# Patient Record
Sex: FEMALE | Race: BLACK OR AFRICAN AMERICAN | NOT HISPANIC OR LATINO | Employment: FULL TIME | URBAN - METROPOLITAN AREA
[De-identification: names, ages, dates, MRNs, and addresses within clinical notes are randomized per-mention and may not be internally consistent; named-entity substitution may affect disease eponyms.]

---

## 2017-05-26 ENCOUNTER — TRANSCRIBE ORDERS (OUTPATIENT)
Dept: OBSTETRICS AND GYNECOLOGY | Facility: CLINIC | Age: 57
End: 2017-05-26

## 2017-05-26 DIAGNOSIS — Z12.31 VISIT FOR SCREENING MAMMOGRAM: Primary | ICD-10-CM

## 2017-06-16 ENCOUNTER — APPOINTMENT (OUTPATIENT)
Dept: MAMMOGRAPHY | Facility: HOSPITAL | Age: 57
End: 2017-06-16
Attending: OBSTETRICS & GYNECOLOGY

## 2017-06-22 ENCOUNTER — TRANSCRIBE ORDERS (OUTPATIENT)
Dept: ADMINISTRATIVE | Facility: HOSPITAL | Age: 57
End: 2017-06-22

## 2017-06-22 DIAGNOSIS — Z12.31 VISIT FOR SCREENING MAMMOGRAM: Primary | ICD-10-CM

## 2017-06-23 ENCOUNTER — HOSPITAL ENCOUNTER (OUTPATIENT)
Dept: MAMMOGRAPHY | Facility: HOSPITAL | Age: 57
Discharge: HOME | End: 2017-06-23
Attending: OBSTETRICS & GYNECOLOGY | Admitting: OBSTETRICS & GYNECOLOGY

## 2017-06-23 DIAGNOSIS — Z12.31 VISIT FOR SCREENING MAMMOGRAM: ICD-10-CM

## 2017-06-23 PROCEDURE — G0202 SCR MAMMO BI INCL CAD: HCPCS

## 2017-06-23 PROCEDURE — 77063 BREAST TOMOSYNTHESIS BI: CPT

## 2017-06-23 PROCEDURE — 77067 SCR MAMMO BI INCL CAD: CPT | Performed by: RADIOLOGY

## 2017-06-23 PROCEDURE — 77063 BREAST TOMOSYNTHESIS BI: CPT | Performed by: RADIOLOGY

## 2017-06-30 ENCOUNTER — OFFICE VISIT (OUTPATIENT)
Dept: OBSTETRICS AND GYNECOLOGY | Facility: CLINIC | Age: 57
End: 2017-06-30

## 2017-06-30 VITALS
DIASTOLIC BLOOD PRESSURE: 80 MMHG | HEIGHT: 66 IN | HEART RATE: 77 BPM | WEIGHT: 208 LBS | RESPIRATION RATE: 14 BRPM | SYSTOLIC BLOOD PRESSURE: 130 MMHG | OXYGEN SATURATION: 96 % | BODY MASS INDEX: 33.43 KG/M2

## 2017-06-30 DIAGNOSIS — Z01.419 ENCOUNTER FOR GYNECOLOGICAL EXAMINATION WITHOUT ABNORMAL FINDING: Primary | ICD-10-CM

## 2017-06-30 PROCEDURE — 99396 PREV VISIT EST AGE 40-64: CPT | Performed by: OBSTETRICS & GYNECOLOGY

## 2017-07-07 NOTE — PROGRESS NOTES
"Subjective   Chief Complaint   Patient presents with   • Gynecologic Exam     No complaints     Jessika Singletary is a 56 y.o. year old  menopausal female presenting to be seen for her annual exam.  This past year she has not been on hormone replacement therapy.  There has not been vaginal bleeding in the last 12 months.  Menopausal symptoms are not present.    SEXUAL Hx:  She is not currently sexually active.  In the past year there has not been new sexual partners.    Condoms are not typically used.  She would not like to be screened for STD's at today's exam.  HEALTH Hx:  She exercises regularly: no (and has no plans to become more active).  She wears her seat belt: yes.  She has concerns about domestic violence: no.  She has noticed changes in height: no.  OTHER COMPLAINTS:  Nothing else    The following portions of the patient's history were reviewed and updated as appropriate:problem list, current medications, allergies, past family history, past medical history, past social history and past surgical history.    Smoking status: Never Smoker                                                              Smokeless status: Never Used                          Review of Systems  Constitutional POS: nothing reported    NEG: anorexia or night sweats   Gastointestinal POS: nothing reported    NEG: bloating, change in bowel habits, melena or reflux symptoms   Genitourinary POS: nothing reported    NEG: dysuria or hematuria   Integument POS: nothing reported    NEG: moles that are changing in size, shape, color or rashes   Breast POS: nothing reported    NEG: persistent breast lump, skin dimpling or nipple discharge        Objective   /80  Pulse 77  Resp 14  Ht 66\" (167.6 cm)  Wt 208 lb (94.3 kg)  LMP  (LMP Unknown)  SpO2 96%  Breastfeeding? No  BMI 33.57 kg/m2    General:  well developed; well nourished  no acute distress   Skin:  No suspicious lesions seen   Thyroid: normal to inspection and " palpation   Breasts:  Examined in supine position  Symmetric without masses or skin dimpling  Nipples normal without inversion, lesions or discharge  There are no palpable axillary nodes   Abdomen: soft, non-tender; no masses  no umbilical or inginual hernias are present  no hepato-splenomegaly   Pelvis: Clinical staff was present for exam  External genitalia:  normal appearance of the external genitalia including Bartholin's and [place]'s glands.  :  urethral meatus normal; urethral hypermobility is absent.  Vaginal:  atrophic mucosal changes are present;  Cervix:  absent.  Uterus:  absent.  Adnexa:  absent, bilateral.        Assessment   1. Well woman exam     Plan   1. Patient doing well.  Await mammogram results for plan of care.  will return to clinic in 1 year or on an as-needed basis.      No orders of the defined types were placed in this encounter.         This note was electronically signed.    MD NIDIA LagosA

## 2017-07-14 ENCOUNTER — APPOINTMENT (OUTPATIENT)
Dept: MAMMOGRAPHY | Facility: HOSPITAL | Age: 57
End: 2017-07-14
Attending: OBSTETRICS & GYNECOLOGY

## 2018-04-12 ENCOUNTER — TRANSCRIBE ORDERS (OUTPATIENT)
Dept: ADMINISTRATIVE | Facility: HOSPITAL | Age: 58
End: 2018-04-12

## 2018-04-12 DIAGNOSIS — Z12.31 VISIT FOR SCREENING MAMMOGRAM: Primary | ICD-10-CM

## 2018-06-28 ENCOUNTER — HOSPITAL ENCOUNTER (OUTPATIENT)
Dept: MAMMOGRAPHY | Facility: HOSPITAL | Age: 58
Discharge: HOME | End: 2018-06-28
Admitting: FAMILY MEDICINE

## 2018-06-28 DIAGNOSIS — Z12.31 VISIT FOR SCREENING MAMMOGRAM: ICD-10-CM

## 2018-06-28 PROCEDURE — 77067 SCR MAMMO BI INCL CAD: CPT

## 2018-06-28 PROCEDURE — 77063 BREAST TOMOSYNTHESIS BI: CPT

## 2018-06-28 PROCEDURE — 77067 SCR MAMMO BI INCL CAD: CPT | Performed by: RADIOLOGY

## 2018-06-28 PROCEDURE — 77063 BREAST TOMOSYNTHESIS BI: CPT | Performed by: RADIOLOGY

## 2019-06-10 ENCOUNTER — TRANSCRIBE ORDERS (OUTPATIENT)
Dept: ADMINISTRATIVE | Facility: HOSPITAL | Age: 59
End: 2019-06-10

## 2019-06-10 DIAGNOSIS — Z12.31 VISIT FOR SCREENING MAMMOGRAM: Primary | ICD-10-CM

## 2019-09-10 ENCOUNTER — HOSPITAL ENCOUNTER (OUTPATIENT)
Dept: MAMMOGRAPHY | Facility: HOSPITAL | Age: 59
Discharge: HOME | End: 2019-09-10
Admitting: FAMILY MEDICINE

## 2019-09-10 DIAGNOSIS — Z12.31 VISIT FOR SCREENING MAMMOGRAM: ICD-10-CM

## 2019-09-10 PROCEDURE — 77063 BREAST TOMOSYNTHESIS BI: CPT

## 2019-09-10 PROCEDURE — 77063 BREAST TOMOSYNTHESIS BI: CPT | Performed by: RADIOLOGY

## 2019-09-10 PROCEDURE — 77067 SCR MAMMO BI INCL CAD: CPT | Performed by: RADIOLOGY

## 2019-09-10 PROCEDURE — 77067 SCR MAMMO BI INCL CAD: CPT

## 2020-08-11 ENCOUNTER — TRANSCRIBE ORDERS (OUTPATIENT)
Dept: ADMINISTRATIVE | Facility: HOSPITAL | Age: 60
End: 2020-08-11

## 2020-08-11 DIAGNOSIS — Z12.31 ENCOUNTER FOR SCREENING MAMMOGRAM FOR MALIGNANT NEOPLASM OF BREAST: Primary | ICD-10-CM

## 2020-11-05 ENCOUNTER — HOSPITAL ENCOUNTER (OUTPATIENT)
Dept: MAMMOGRAPHY | Facility: HOSPITAL | Age: 60
Discharge: HOME | End: 2020-11-05
Admitting: FAMILY MEDICINE

## 2020-11-05 DIAGNOSIS — Z12.31 ENCOUNTER FOR SCREENING MAMMOGRAM FOR MALIGNANT NEOPLASM OF BREAST: ICD-10-CM

## 2020-11-05 PROCEDURE — 77067 SCR MAMMO BI INCL CAD: CPT

## 2020-11-05 PROCEDURE — 77067 SCR MAMMO BI INCL CAD: CPT | Performed by: RADIOLOGY

## 2020-11-05 PROCEDURE — 77063 BREAST TOMOSYNTHESIS BI: CPT

## 2020-11-05 PROCEDURE — 77063 BREAST TOMOSYNTHESIS BI: CPT | Performed by: RADIOLOGY

## 2020-12-10 ENCOUNTER — APPOINTMENT (OUTPATIENT)
Dept: MAMMOGRAPHY | Facility: HOSPITAL | Age: 60
End: 2020-12-10

## 2021-07-08 ENCOUNTER — APPOINTMENT (OUTPATIENT)
Dept: GENERAL RADIOLOGY | Facility: HOSPITAL | Age: 61
End: 2021-07-08

## 2021-07-08 ENCOUNTER — HOSPITAL ENCOUNTER (OUTPATIENT)
Facility: HOSPITAL | Age: 61
Discharge: HOME | End: 2021-07-09
Attending: EMERGENCY MEDICINE | Admitting: SURGERY

## 2021-07-08 ENCOUNTER — ANESTHESIA EVENT (OUTPATIENT)
Dept: PERIOP | Facility: HOSPITAL | Age: 61
End: 2021-07-08

## 2021-07-08 ENCOUNTER — APPOINTMENT (OUTPATIENT)
Dept: CT IMAGING | Facility: HOSPITAL | Age: 61
End: 2021-07-08

## 2021-07-08 ENCOUNTER — ANESTHESIA (OUTPATIENT)
Dept: PERIOP | Facility: HOSPITAL | Age: 61
End: 2021-07-08

## 2021-07-08 DIAGNOSIS — K56.609 SMALL BOWEL OBSTRUCTION (HCC): ICD-10-CM

## 2021-07-08 DIAGNOSIS — K45.0 OBSTRUCTED INTERNAL HERNIA: Primary | ICD-10-CM

## 2021-07-08 DIAGNOSIS — R10.10 PAIN OF UPPER ABDOMEN: ICD-10-CM

## 2021-07-08 LAB
ALBUMIN SERPL-MCNC: 4.2 G/DL (ref 3.5–5.2)
ALBUMIN/GLOB SERPL: 1.1 G/DL
ALP SERPL-CCNC: 80 U/L (ref 39–117)
ALT SERPL W P-5'-P-CCNC: 16 U/L (ref 1–33)
ANION GAP SERPL CALCULATED.3IONS-SCNC: 8 MMOL/L (ref 5–15)
AST SERPL-CCNC: 24 U/L (ref 1–32)
BACTERIA UR QL AUTO: ABNORMAL /HPF
BASOPHILS # BLD AUTO: 0.03 10*3/MM3 (ref 0–0.2)
BASOPHILS NFR BLD AUTO: 0.4 % (ref 0–1.5)
BILIRUB SERPL-MCNC: 1.5 MG/DL (ref 0–1.2)
BILIRUB UR QL STRIP: NEGATIVE
BUN SERPL-MCNC: 12 MG/DL (ref 8–23)
BUN/CREAT SERPL: 11.9 (ref 7–25)
CALCIUM SPEC-SCNC: 10 MG/DL (ref 8.6–10.5)
CHLORIDE SERPL-SCNC: 98 MMOL/L (ref 98–107)
CLARITY UR: ABNORMAL
CO2 SERPL-SCNC: 30 MMOL/L (ref 22–29)
COLOR UR: YELLOW
CREAT SERPL-MCNC: 1.01 MG/DL (ref 0.57–1)
D-LACTATE SERPL-SCNC: 0.8 MMOL/L (ref 0.5–2)
DEPRECATED RDW RBC AUTO: 45 FL (ref 37–54)
EOSINOPHIL # BLD AUTO: 0.09 10*3/MM3 (ref 0–0.4)
EOSINOPHIL NFR BLD AUTO: 1.2 % (ref 0.3–6.2)
ERYTHROCYTE [DISTWIDTH] IN BLOOD BY AUTOMATED COUNT: 13.7 % (ref 12.3–15.4)
GFR SERPL CREATININE-BSD FRML MDRD: 68 ML/MIN/1.73
GLOBULIN UR ELPH-MCNC: 3.8 GM/DL
GLUCOSE SERPL-MCNC: 130 MG/DL (ref 65–99)
GLUCOSE UR STRIP-MCNC: NEGATIVE MG/DL
HCT VFR BLD AUTO: 43.8 % (ref 34–46.6)
HGB BLD-MCNC: 13.8 G/DL (ref 12–15.9)
HGB UR QL STRIP.AUTO: NEGATIVE
HOLD SPECIMEN: NORMAL
IMM GRANULOCYTES # BLD AUTO: 0.01 10*3/MM3 (ref 0–0.05)
IMM GRANULOCYTES NFR BLD AUTO: 0.1 % (ref 0–0.5)
KETONES UR QL STRIP: ABNORMAL
LEUKOCYTE ESTERASE UR QL STRIP.AUTO: ABNORMAL
LIPASE SERPL-CCNC: 40 U/L (ref 13–60)
LYMPHOCYTES # BLD AUTO: 1.6 10*3/MM3 (ref 0.7–3.1)
LYMPHOCYTES NFR BLD AUTO: 21.2 % (ref 19.6–45.3)
MCH RBC QN AUTO: 28.2 PG (ref 26.6–33)
MCHC RBC AUTO-ENTMCNC: 31.5 G/DL (ref 31.5–35.7)
MCV RBC AUTO: 89.6 FL (ref 79–97)
MONOCYTES # BLD AUTO: 0.44 10*3/MM3 (ref 0.1–0.9)
MONOCYTES NFR BLD AUTO: 5.8 % (ref 5–12)
NEUTROPHILS NFR BLD AUTO: 5.36 10*3/MM3 (ref 1.7–7)
NEUTROPHILS NFR BLD AUTO: 71.3 % (ref 42.7–76)
NITRITE UR QL STRIP: NEGATIVE
NRBC BLD AUTO-RTO: 0 /100 WBC (ref 0–0.2)
PH UR STRIP.AUTO: 8.5 [PH] (ref 5–8)
PLATELET # BLD AUTO: 254 10*3/MM3 (ref 140–450)
PMV BLD AUTO: 11.1 FL (ref 6–12)
POTASSIUM SERPL-SCNC: 4.2 MMOL/L (ref 3.5–5.2)
PROT SERPL-MCNC: 8 G/DL (ref 6–8.5)
PROT UR QL STRIP: ABNORMAL
RBC # BLD AUTO: 4.89 10*6/MM3 (ref 3.77–5.28)
RBC # UR: ABNORMAL /HPF
REF LAB TEST METHOD: ABNORMAL
SARS-COV-2 RDRP RESP QL NAA+PROBE: NORMAL
SODIUM SERPL-SCNC: 136 MMOL/L (ref 136–145)
SP GR UR STRIP: 1.03 (ref 1–1.03)
SQUAMOUS #/AREA URNS HPF: ABNORMAL /HPF
TROPONIN T SERPL-MCNC: <0.01 NG/ML (ref 0–0.03)
UROBILINOGEN UR QL STRIP: ABNORMAL
WBC # BLD AUTO: 7.53 10*3/MM3 (ref 3.4–10.8)
WBC UR QL AUTO: ABNORMAL /HPF
WHOLE BLOOD HOLD SPECIMEN: NORMAL

## 2021-07-08 PROCEDURE — 93005 ELECTROCARDIOGRAM TRACING: CPT

## 2021-07-08 PROCEDURE — G0378 HOSPITAL OBSERVATION PER HR: HCPCS

## 2021-07-08 PROCEDURE — 25010000002 HYDROMORPHONE PER 4 MG: Performed by: NURSE ANESTHETIST, CERTIFIED REGISTERED

## 2021-07-08 PROCEDURE — 85025 COMPLETE CBC W/AUTO DIFF WBC: CPT

## 2021-07-08 PROCEDURE — 25010000002 KETOROLAC TROMETHAMINE PER 15 MG: Performed by: NURSE ANESTHETIST, CERTIFIED REGISTERED

## 2021-07-08 PROCEDURE — 87635 SARS-COV-2 COVID-19 AMP PRB: CPT | Performed by: PHYSICIAN ASSISTANT

## 2021-07-08 PROCEDURE — 25010000002 FENTANYL CITRATE (PF) 50 MCG/ML SOLUTION: Performed by: NURSE ANESTHETIST, CERTIFIED REGISTERED

## 2021-07-08 PROCEDURE — 80053 COMPREHEN METABOLIC PANEL: CPT

## 2021-07-08 PROCEDURE — 93005 ELECTROCARDIOGRAM TRACING: CPT | Performed by: EMERGENCY MEDICINE

## 2021-07-08 PROCEDURE — 25010000002 NEOSTIGMINE 10 MG/10ML SOLUTION: Performed by: NURSE ANESTHETIST, CERTIFIED REGISTERED

## 2021-07-08 PROCEDURE — C9803 HOPD COVID-19 SPEC COLLECT: HCPCS

## 2021-07-08 PROCEDURE — 25010000002 PROPOFOL 10 MG/ML EMULSION: Performed by: NURSE ANESTHETIST, CERTIFIED REGISTERED

## 2021-07-08 PROCEDURE — 84484 ASSAY OF TROPONIN QUANT: CPT

## 2021-07-08 PROCEDURE — 25010000002 METOCLOPRAMIDE PER 10 MG: Performed by: SURGERY

## 2021-07-08 PROCEDURE — 99284 EMERGENCY DEPT VISIT MOD MDM: CPT

## 2021-07-08 PROCEDURE — 71045 X-RAY EXAM CHEST 1 VIEW: CPT

## 2021-07-08 PROCEDURE — 81001 URINALYSIS AUTO W/SCOPE: CPT

## 2021-07-08 PROCEDURE — 49320 DIAG LAPARO SEPARATE PROC: CPT | Performed by: PHYSICIAN ASSISTANT

## 2021-07-08 PROCEDURE — 96374 THER/PROPH/DIAG INJ IV PUSH: CPT

## 2021-07-08 PROCEDURE — 25010000002 DEXAMETHASONE PER 1 MG: Performed by: NURSE ANESTHETIST, CERTIFIED REGISTERED

## 2021-07-08 PROCEDURE — 83605 ASSAY OF LACTIC ACID: CPT | Performed by: PHYSICIAN ASSISTANT

## 2021-07-08 PROCEDURE — 25010000003 CEFAZOLIN IN DEXTROSE 2-4 GM/100ML-% SOLUTION: Performed by: SURGERY

## 2021-07-08 PROCEDURE — 83690 ASSAY OF LIPASE: CPT

## 2021-07-08 PROCEDURE — 25010000002 ONDANSETRON PER 1 MG: Performed by: NURSE ANESTHETIST, CERTIFIED REGISTERED

## 2021-07-08 PROCEDURE — 74176 CT ABD & PELVIS W/O CONTRAST: CPT

## 2021-07-08 PROCEDURE — 25010000002 SUCCINYLCHOLINE PER 20 MG: Performed by: NURSE ANESTHETIST, CERTIFIED REGISTERED

## 2021-07-08 RX ORDER — FENTANYL CITRATE 50 UG/ML
50 INJECTION, SOLUTION INTRAMUSCULAR; INTRAVENOUS
Status: DISCONTINUED | OUTPATIENT
Start: 2021-07-08 | End: 2021-07-08 | Stop reason: HOSPADM

## 2021-07-08 RX ORDER — ALUMINA, MAGNESIA, AND SIMETHICONE 2400; 2400; 240 MG/30ML; MG/30ML; MG/30ML
15 SUSPENSION ORAL ONCE
Status: COMPLETED | OUTPATIENT
Start: 2021-07-08 | End: 2021-07-08

## 2021-07-08 RX ORDER — FLUTICASONE PROPIONATE 50 MCG
2 SPRAY, SUSPENSION (ML) NASAL DAILY
Status: DISCONTINUED | OUTPATIENT
Start: 2021-07-08 | End: 2021-07-09 | Stop reason: HOSPADM

## 2021-07-08 RX ORDER — MAGNESIUM HYDROXIDE 1200 MG/15ML
LIQUID ORAL AS NEEDED
Status: DISCONTINUED | OUTPATIENT
Start: 2021-07-08 | End: 2021-07-08 | Stop reason: HOSPADM

## 2021-07-08 RX ORDER — NALOXONE HCL 0.4 MG/ML
0.1 VIAL (ML) INJECTION
Status: DISCONTINUED | OUTPATIENT
Start: 2021-07-08 | End: 2021-07-09 | Stop reason: HOSPADM

## 2021-07-08 RX ORDER — METOCLOPRAMIDE HYDROCHLORIDE 5 MG/ML
10 INJECTION INTRAMUSCULAR; INTRAVENOUS EVERY 6 HOURS
Status: DISCONTINUED | OUTPATIENT
Start: 2021-07-08 | End: 2021-07-09 | Stop reason: HOSPADM

## 2021-07-08 RX ORDER — CETIRIZINE HYDROCHLORIDE 10 MG/1
10 TABLET ORAL DAILY
Status: DISCONTINUED | OUTPATIENT
Start: 2021-07-08 | End: 2021-07-09 | Stop reason: HOSPADM

## 2021-07-08 RX ORDER — HEPARIN SODIUM 5000 [USP'U]/ML
5000 INJECTION, SOLUTION INTRAVENOUS; SUBCUTANEOUS EVERY 8 HOURS SCHEDULED
Status: DISCONTINUED | OUTPATIENT
Start: 2021-07-09 | End: 2021-07-09 | Stop reason: HOSPADM

## 2021-07-08 RX ORDER — MIDAZOLAM HYDROCHLORIDE 1 MG/ML
1 INJECTION INTRAMUSCULAR; INTRAVENOUS
Status: DISCONTINUED | OUTPATIENT
Start: 2021-07-08 | End: 2021-07-08 | Stop reason: HOSPADM

## 2021-07-08 RX ORDER — LIDOCAINE HYDROCHLORIDE 10 MG/ML
INJECTION, SOLUTION EPIDURAL; INFILTRATION; INTRACAUDAL; PERINEURAL AS NEEDED
Status: DISCONTINUED | OUTPATIENT
Start: 2021-07-08 | End: 2021-07-08 | Stop reason: SURG

## 2021-07-08 RX ORDER — DOCUSATE SODIUM 100 MG/1
100 CAPSULE, LIQUID FILLED ORAL 2 TIMES DAILY PRN
Status: DISCONTINUED | OUTPATIENT
Start: 2021-07-08 | End: 2021-07-09 | Stop reason: HOSPADM

## 2021-07-08 RX ORDER — SODIUM CHLORIDE, SODIUM LACTATE, POTASSIUM CHLORIDE, CALCIUM CHLORIDE 600; 310; 30; 20 MG/100ML; MG/100ML; MG/100ML; MG/100ML
9 INJECTION, SOLUTION INTRAVENOUS CONTINUOUS PRN
Status: DISCONTINUED | OUTPATIENT
Start: 2021-07-08 | End: 2021-07-08 | Stop reason: HOSPADM

## 2021-07-08 RX ORDER — CEFAZOLIN SODIUM 2 G/100ML
2 INJECTION, SOLUTION INTRAVENOUS ONCE
Status: COMPLETED | OUTPATIENT
Start: 2021-07-08 | End: 2021-07-08

## 2021-07-08 RX ORDER — SODIUM CHLORIDE 0.9 % (FLUSH) 0.9 %
10 SYRINGE (ML) INJECTION EVERY 12 HOURS SCHEDULED
Status: DISCONTINUED | OUTPATIENT
Start: 2021-07-08 | End: 2021-07-08 | Stop reason: HOSPADM

## 2021-07-08 RX ORDER — ONDANSETRON 2 MG/ML
4 INJECTION INTRAMUSCULAR; INTRAVENOUS ONCE AS NEEDED
Status: DISCONTINUED | OUTPATIENT
Start: 2021-07-08 | End: 2021-07-08 | Stop reason: HOSPADM

## 2021-07-08 RX ORDER — FAMOTIDINE 10 MG/ML
20 INJECTION, SOLUTION INTRAVENOUS
Status: DISCONTINUED | OUTPATIENT
Start: 2021-07-08 | End: 2021-07-08

## 2021-07-08 RX ORDER — FENTANYL CITRATE 50 UG/ML
INJECTION, SOLUTION INTRAMUSCULAR; INTRAVENOUS AS NEEDED
Status: DISCONTINUED | OUTPATIENT
Start: 2021-07-08 | End: 2021-07-08 | Stop reason: SURG

## 2021-07-08 RX ORDER — BISACODYL 5 MG/1
10 TABLET, DELAYED RELEASE ORAL DAILY
Status: DISCONTINUED | OUTPATIENT
Start: 2021-07-08 | End: 2021-07-09 | Stop reason: HOSPADM

## 2021-07-08 RX ORDER — GLYCOPYRROLATE 0.2 MG/ML
INJECTION INTRAMUSCULAR; INTRAVENOUS AS NEEDED
Status: DISCONTINUED | OUTPATIENT
Start: 2021-07-08 | End: 2021-07-08 | Stop reason: SURG

## 2021-07-08 RX ORDER — ONDANSETRON 2 MG/ML
INJECTION INTRAMUSCULAR; INTRAVENOUS AS NEEDED
Status: DISCONTINUED | OUTPATIENT
Start: 2021-07-08 | End: 2021-07-08 | Stop reason: SURG

## 2021-07-08 RX ORDER — SODIUM CHLORIDE, SODIUM LACTATE, POTASSIUM CHLORIDE, CALCIUM CHLORIDE 600; 310; 30; 20 MG/100ML; MG/100ML; MG/100ML; MG/100ML
100 INJECTION, SOLUTION INTRAVENOUS CONTINUOUS
Status: DISCONTINUED | OUTPATIENT
Start: 2021-07-08 | End: 2021-07-09

## 2021-07-08 RX ORDER — PROMETHAZINE HYDROCHLORIDE 25 MG/1
25 TABLET ORAL ONCE AS NEEDED
Status: DISCONTINUED | OUTPATIENT
Start: 2021-07-08 | End: 2021-07-08 | Stop reason: HOSPADM

## 2021-07-08 RX ORDER — ONDANSETRON 4 MG/1
4 TABLET, FILM COATED ORAL EVERY 6 HOURS PRN
Status: DISCONTINUED | OUTPATIENT
Start: 2021-07-08 | End: 2021-07-09 | Stop reason: HOSPADM

## 2021-07-08 RX ORDER — HYDROMORPHONE HYDROCHLORIDE 1 MG/ML
0.5 INJECTION, SOLUTION INTRAMUSCULAR; INTRAVENOUS; SUBCUTANEOUS
Status: DISCONTINUED | OUTPATIENT
Start: 2021-07-08 | End: 2021-07-08 | Stop reason: HOSPADM

## 2021-07-08 RX ORDER — FAMOTIDINE 20 MG/1
20 TABLET, FILM COATED ORAL 2 TIMES DAILY
Status: DISCONTINUED | OUTPATIENT
Start: 2021-07-08 | End: 2021-07-09 | Stop reason: HOSPADM

## 2021-07-08 RX ORDER — FLUTICASONE PROPIONATE 50 MCG
2 SPRAY, SUSPENSION (ML) NASAL DAILY
COMMUNITY
End: 2021-09-27

## 2021-07-08 RX ORDER — OXYCODONE HYDROCHLORIDE AND ACETAMINOPHEN 5; 325 MG/1; MG/1
1 TABLET ORAL EVERY 4 HOURS PRN
Status: DISCONTINUED | OUTPATIENT
Start: 2021-07-08 | End: 2021-07-09 | Stop reason: HOSPADM

## 2021-07-08 RX ORDER — LIDOCAINE HYDROCHLORIDE 20 MG/ML
15 SOLUTION OROPHARYNGEAL ONCE
Status: COMPLETED | OUTPATIENT
Start: 2021-07-08 | End: 2021-07-08

## 2021-07-08 RX ORDER — CETIRIZINE HYDROCHLORIDE 10 MG/1
10 TABLET ORAL DAILY
COMMUNITY
End: 2021-09-27

## 2021-07-08 RX ORDER — DEXAMETHASONE SODIUM PHOSPHATE 4 MG/ML
INJECTION, SOLUTION INTRA-ARTICULAR; INTRALESIONAL; INTRAMUSCULAR; INTRAVENOUS; SOFT TISSUE AS NEEDED
Status: DISCONTINUED | OUTPATIENT
Start: 2021-07-08 | End: 2021-07-08 | Stop reason: SURG

## 2021-07-08 RX ORDER — DROPERIDOL 2.5 MG/ML
0.62 INJECTION, SOLUTION INTRAMUSCULAR; INTRAVENOUS ONCE AS NEEDED
Status: DISCONTINUED | OUTPATIENT
Start: 2021-07-08 | End: 2021-07-08 | Stop reason: HOSPADM

## 2021-07-08 RX ORDER — SUCCINYLCHOLINE CHLORIDE 20 MG/ML
INJECTION INTRAMUSCULAR; INTRAVENOUS AS NEEDED
Status: DISCONTINUED | OUTPATIENT
Start: 2021-07-08 | End: 2021-07-08 | Stop reason: SURG

## 2021-07-08 RX ORDER — SODIUM CHLORIDE 0.9 % (FLUSH) 0.9 %
10 SYRINGE (ML) INJECTION AS NEEDED
Status: DISCONTINUED | OUTPATIENT
Start: 2021-07-08 | End: 2021-07-08 | Stop reason: HOSPADM

## 2021-07-08 RX ORDER — KETOROLAC TROMETHAMINE 30 MG/ML
INJECTION, SOLUTION INTRAMUSCULAR; INTRAVENOUS AS NEEDED
Status: DISCONTINUED | OUTPATIENT
Start: 2021-07-08 | End: 2021-07-08 | Stop reason: SURG

## 2021-07-08 RX ORDER — BUPIVACAINE HYDROCHLORIDE AND EPINEPHRINE 2.5; 5 MG/ML; UG/ML
INJECTION, SOLUTION EPIDURAL; INFILTRATION; INTRACAUDAL; PERINEURAL AS NEEDED
Status: DISCONTINUED | OUTPATIENT
Start: 2021-07-08 | End: 2021-07-08 | Stop reason: HOSPADM

## 2021-07-08 RX ORDER — ROCURONIUM BROMIDE 10 MG/ML
INJECTION, SOLUTION INTRAVENOUS AS NEEDED
Status: DISCONTINUED | OUTPATIENT
Start: 2021-07-08 | End: 2021-07-08 | Stop reason: SURG

## 2021-07-08 RX ORDER — FAMOTIDINE 20 MG/1
20 TABLET, FILM COATED ORAL
Status: DISCONTINUED | OUTPATIENT
Start: 2021-07-08 | End: 2021-07-08

## 2021-07-08 RX ORDER — ONDANSETRON 2 MG/ML
4 INJECTION INTRAMUSCULAR; INTRAVENOUS EVERY 6 HOURS PRN
Status: DISCONTINUED | OUTPATIENT
Start: 2021-07-08 | End: 2021-07-09 | Stop reason: HOSPADM

## 2021-07-08 RX ORDER — PROPOFOL 10 MG/ML
VIAL (ML) INTRAVENOUS AS NEEDED
Status: DISCONTINUED | OUTPATIENT
Start: 2021-07-08 | End: 2021-07-08 | Stop reason: SURG

## 2021-07-08 RX ORDER — SIMETHICONE 80 MG
80 TABLET,CHEWABLE ORAL 4 TIMES DAILY PRN
Status: DISCONTINUED | OUTPATIENT
Start: 2021-07-08 | End: 2021-07-09 | Stop reason: HOSPADM

## 2021-07-08 RX ORDER — BUPIVACAINE HCL/0.9 % NACL/PF 0.125 %
PLASTIC BAG, INJECTION (ML) EPIDURAL AS NEEDED
Status: DISCONTINUED | OUTPATIENT
Start: 2021-07-08 | End: 2021-07-08 | Stop reason: SURG

## 2021-07-08 RX ORDER — FAMOTIDINE 10 MG/ML
20 INJECTION, SOLUTION INTRAVENOUS ONCE
Status: COMPLETED | OUTPATIENT
Start: 2021-07-08 | End: 2021-07-08

## 2021-07-08 RX ORDER — SODIUM CHLORIDE 0.9 % (FLUSH) 0.9 %
10 SYRINGE (ML) INJECTION AS NEEDED
Status: DISCONTINUED | OUTPATIENT
Start: 2021-07-08 | End: 2021-07-09 | Stop reason: HOSPADM

## 2021-07-08 RX ORDER — HYDROMORPHONE HCL 110MG/55ML
PATIENT CONTROLLED ANALGESIA SYRINGE INTRAVENOUS AS NEEDED
Status: DISCONTINUED | OUTPATIENT
Start: 2021-07-08 | End: 2021-07-08 | Stop reason: SURG

## 2021-07-08 RX ORDER — IPRATROPIUM BROMIDE AND ALBUTEROL SULFATE 2.5; .5 MG/3ML; MG/3ML
3 SOLUTION RESPIRATORY (INHALATION) ONCE AS NEEDED
Status: DISCONTINUED | OUTPATIENT
Start: 2021-07-08 | End: 2021-07-08 | Stop reason: HOSPADM

## 2021-07-08 RX ORDER — PROMETHAZINE HYDROCHLORIDE 25 MG/1
25 SUPPOSITORY RECTAL ONCE AS NEEDED
Status: DISCONTINUED | OUTPATIENT
Start: 2021-07-08 | End: 2021-07-08 | Stop reason: HOSPADM

## 2021-07-08 RX ORDER — HYDROMORPHONE HYDROCHLORIDE 1 MG/ML
0.2 INJECTION, SOLUTION INTRAMUSCULAR; INTRAVENOUS; SUBCUTANEOUS
Status: DISCONTINUED | OUTPATIENT
Start: 2021-07-08 | End: 2021-07-09 | Stop reason: HOSPADM

## 2021-07-08 RX ORDER — KETOROLAC TROMETHAMINE 30 MG/ML
30 INJECTION, SOLUTION INTRAMUSCULAR; INTRAVENOUS EVERY 6 HOURS PRN
Status: DISCONTINUED | OUTPATIENT
Start: 2021-07-08 | End: 2021-07-09 | Stop reason: HOSPADM

## 2021-07-08 RX ORDER — ENALAPRILAT 2.5 MG/2ML
1.25 INJECTION INTRAVENOUS EVERY 6 HOURS PRN
Status: DISCONTINUED | OUTPATIENT
Start: 2021-07-08 | End: 2021-07-09 | Stop reason: HOSPADM

## 2021-07-08 RX ORDER — LIDOCAINE HYDROCHLORIDE 10 MG/ML
0.5 INJECTION, SOLUTION EPIDURAL; INFILTRATION; INTRACAUDAL; PERINEURAL ONCE AS NEEDED
Status: DISCONTINUED | OUTPATIENT
Start: 2021-07-08 | End: 2021-07-08 | Stop reason: HOSPADM

## 2021-07-08 RX ORDER — NEOSTIGMINE METHYLSULFATE 1 MG/ML
INJECTION, SOLUTION INTRAVENOUS AS NEEDED
Status: DISCONTINUED | OUTPATIENT
Start: 2021-07-08 | End: 2021-07-08 | Stop reason: SURG

## 2021-07-08 RX ADMIN — Medication 160 MCG: at 07:55

## 2021-07-08 RX ADMIN — ROCURONIUM BROMIDE 30 MG: 10 INJECTION, SOLUTION INTRAVENOUS at 07:24

## 2021-07-08 RX ADMIN — LIDOCAINE HYDROCHLORIDE 50 MG: 10 INJECTION, SOLUTION EPIDURAL; INFILTRATION; INTRACAUDAL; PERINEURAL at 07:21

## 2021-07-08 RX ADMIN — DEXAMETHASONE SODIUM PHOSPHATE 8 MG: 4 INJECTION, SOLUTION INTRA-ARTICULAR; INTRALESIONAL; INTRAMUSCULAR; INTRAVENOUS; SOFT TISSUE at 07:26

## 2021-07-08 RX ADMIN — BISACODYL 10 MG: 5 TABLET, COATED ORAL at 11:32

## 2021-07-08 RX ADMIN — METOCLOPRAMIDE 10 MG: 5 INJECTION, SOLUTION INTRAMUSCULAR; INTRAVENOUS at 23:56

## 2021-07-08 RX ADMIN — HYDROMORPHONE HYDROCHLORIDE 0.5 MG: 2 INJECTION, SOLUTION INTRAMUSCULAR; INTRAVENOUS; SUBCUTANEOUS at 08:18

## 2021-07-08 RX ADMIN — NEOSTIGMINE 5 MG: 1 INJECTION INTRAVENOUS at 08:00

## 2021-07-08 RX ADMIN — HYDROMORPHONE HYDROCHLORIDE 0.5 MG: 2 INJECTION, SOLUTION INTRAMUSCULAR; INTRAVENOUS; SUBCUTANEOUS at 07:56

## 2021-07-08 RX ADMIN — SUCCINYLCHOLINE CHLORIDE 120 MG: 20 INJECTION, SOLUTION INTRAMUSCULAR; INTRAVENOUS at 07:21

## 2021-07-08 RX ADMIN — OXYCODONE HYDROCHLORIDE AND ACETAMINOPHEN 1 TABLET: 5; 325 TABLET ORAL at 21:22

## 2021-07-08 RX ADMIN — ALUMINUM HYDROXIDE, MAGNESIUM HYDROXIDE, AND DIMETHICONE 15 ML: 400; 400; 40 SUSPENSION ORAL at 03:47

## 2021-07-08 RX ADMIN — ONDANSETRON 4 MG: 2 INJECTION INTRAMUSCULAR; INTRAVENOUS at 07:26

## 2021-07-08 RX ADMIN — Medication 80 MCG: at 07:38

## 2021-07-08 RX ADMIN — FAMOTIDINE 20 MG: 20 TABLET ORAL at 20:02

## 2021-07-08 RX ADMIN — ROCURONIUM BROMIDE 10 MG: 10 INJECTION, SOLUTION INTRAVENOUS at 07:21

## 2021-07-08 RX ADMIN — Medication 80 MCG: at 07:45

## 2021-07-08 RX ADMIN — PROPOFOL 200 MG: 10 INJECTION, EMULSION INTRAVENOUS at 07:21

## 2021-07-08 RX ADMIN — CETIRIZINE HYDROCHLORIDE 10 MG: 10 TABLET, FILM COATED ORAL at 11:32

## 2021-07-08 RX ADMIN — METOCLOPRAMIDE 10 MG: 5 INJECTION, SOLUTION INTRAMUSCULAR; INTRAVENOUS at 17:44

## 2021-07-08 RX ADMIN — CEFAZOLIN SODIUM 2 G: 10 INJECTION, POWDER, FOR SOLUTION INTRAVENOUS at 07:18

## 2021-07-08 RX ADMIN — FAMOTIDINE 20 MG: 10 INJECTION, SOLUTION INTRAVENOUS at 03:47

## 2021-07-08 RX ADMIN — FENTANYL CITRATE 100 MCG: 50 INJECTION, SOLUTION INTRAMUSCULAR; INTRAVENOUS at 07:21

## 2021-07-08 RX ADMIN — KETOROLAC TROMETHAMINE 30 MG: 30 INJECTION, SOLUTION INTRAMUSCULAR; INTRAVENOUS at 08:00

## 2021-07-08 RX ADMIN — METOCLOPRAMIDE 10 MG: 5 INJECTION, SOLUTION INTRAMUSCULAR; INTRAVENOUS at 11:32

## 2021-07-08 RX ADMIN — LIDOCAINE HYDROCHLORIDE 15 ML: 20 SOLUTION ORAL; TOPICAL at 03:47

## 2021-07-08 RX ADMIN — SODIUM CHLORIDE, POTASSIUM CHLORIDE, SODIUM LACTATE AND CALCIUM CHLORIDE 9 ML/HR: 600; 310; 30; 20 INJECTION, SOLUTION INTRAVENOUS at 06:45

## 2021-07-08 RX ADMIN — HYDROMORPHONE HYDROCHLORIDE 0.5 MG: 2 INJECTION, SOLUTION INTRAMUSCULAR; INTRAVENOUS; SUBCUTANEOUS at 08:10

## 2021-07-08 RX ADMIN — HYDROMORPHONE HYDROCHLORIDE 0.5 MG: 2 INJECTION, SOLUTION INTRAMUSCULAR; INTRAVENOUS; SUBCUTANEOUS at 08:02

## 2021-07-08 RX ADMIN — FAMOTIDINE 20 MG: 20 TABLET ORAL at 11:32

## 2021-07-08 RX ADMIN — SODIUM CHLORIDE, POTASSIUM CHLORIDE, SODIUM LACTATE AND CALCIUM CHLORIDE 125 ML/HR: 600; 310; 30; 20 INJECTION, SOLUTION INTRAVENOUS at 11:32

## 2021-07-08 RX ADMIN — OXYCODONE HYDROCHLORIDE AND ACETAMINOPHEN 1 TABLET: 5; 325 TABLET ORAL at 17:47

## 2021-07-08 RX ADMIN — SODIUM CHLORIDE, PRESERVATIVE FREE 10 ML: 5 INJECTION INTRAVENOUS at 06:45

## 2021-07-08 RX ADMIN — GLYCOPYRROLATE 0.8 MG: 0.4 INJECTION INTRAMUSCULAR; INTRAVENOUS at 08:00

## 2021-07-08 NOTE — ED PROVIDER NOTES
Subjective   Patient is a 60-year-old female who presents emergency room today with complaints of mid epigastric pain.  Patient shares that yesterday morning at approximately 10 AM she experienced a sharp pain in her stomach.  Patient reports that she drank some Sprite which did not help.  She states that after trying Sprite with no improvement she made herself vomit.  She shares that she felt better after vomiting but then her pain came back and it is worse.  She shares she lay down to go to sleep tonight and could not sleep because of the pain and thus the reason for her presentation to the ER.  She denies any constipation but shares recent bowel movement was softer than normal. Patient shares that she recently saw a physician because of wheezing and was prescribed Flonase and an antihistamine.  Patient's only past surgical history includes a hysterectomy.  No additional complaints on interview and exam.          Review of Systems   Constitutional: Positive for activity change and appetite change. Negative for chills and fever.   Respiratory: Negative.    Cardiovascular: Negative.    Gastrointestinal: Positive for abdominal pain, nausea and vomiting. Negative for constipation and diarrhea.   Genitourinary: Negative.    All other systems reviewed and are negative.      No past medical history on file.    No Known Allergies    No past surgical history on file.    Family History   Problem Relation Age of Onset   • Breast cancer Paternal Aunt    • Hypertension Mother    • Stroke Mother    • Diabetes Grandchild    • Ovarian cancer Neg Hx        Social History     Socioeconomic History   • Marital status: Single     Spouse name: Not on file   • Number of children: Not on file   • Years of education: Not on file   • Highest education level: Not on file   Tobacco Use   • Smoking status: Never Smoker   • Smokeless tobacco: Never Used   Substance and Sexual Activity   • Alcohol use: No   • Sexual activity: Not Currently      Partners: Male     Birth control/protection: None           Objective   Physical Exam  Vitals and nursing note reviewed.   Constitutional:       General: She is not in acute distress.     Appearance: Normal appearance. She is not ill-appearing or toxic-appearing.   HENT:      Head: Normocephalic and atraumatic.      Nose: Nose normal.      Mouth/Throat:      Mouth: Mucous membranes are moist.   Eyes:      Extraocular Movements: Extraocular movements intact.      Conjunctiva/sclera: Conjunctivae normal.   Cardiovascular:      Rate and Rhythm: Normal rate and regular rhythm.   Pulmonary:      Effort: Pulmonary effort is normal. No respiratory distress.   Abdominal:      General: Bowel sounds are decreased. There is no distension.      Palpations: Abdomen is soft.      Tenderness: There is abdominal tenderness in the epigastric area. There is no guarding or rebound.   Musculoskeletal:         General: Normal range of motion.      Cervical back: Normal range of motion.   Skin:     General: Skin is warm and dry.   Neurological:      General: No focal deficit present.      Mental Status: She is alert.   Psychiatric:         Mood and Affect: Mood normal.         Behavior: Behavior normal.         Thought Content: Thought content normal.         Judgment: Judgment normal.         Procedures           ED Course  ED Course as of Jul 08 0608   Thu Jul 08, 2021   0447 Spoke with Dr. Narayanan of general surgery who will see and evaluate patient. Requested COVID-19 testing.     [JG]   0607 Case reviewed with Dr. Narayanan who will take the patient to the OR.    [JG]      ED Course User Index  [JG] Joel Thompson PA      Recent Results (from the past 24 hour(s))   Comprehensive Metabolic Panel    Collection Time: 07/08/21  2:05 AM    Specimen: Blood   Result Value Ref Range    Glucose 130 (H) 65 - 99 mg/dL    BUN 12 8 - 23 mg/dL    Creatinine 1.01 (H) 0.57 - 1.00 mg/dL    Sodium 136 136 - 145 mmol/L    Potassium 4.2 3.5 - 5.2 mmol/L     Chloride 98 98 - 107 mmol/L    CO2 30.0 (H) 22.0 - 29.0 mmol/L    Calcium 10.0 8.6 - 10.5 mg/dL    Total Protein 8.0 6.0 - 8.5 g/dL    Albumin 4.20 3.50 - 5.20 g/dL    ALT (SGPT) 16 1 - 33 U/L    AST (SGOT) 24 1 - 32 U/L    Alkaline Phosphatase 80 39 - 117 U/L    Total Bilirubin 1.5 (H) 0.0 - 1.2 mg/dL    eGFR  African Amer 68 >60 mL/min/1.73    Globulin 3.8 gm/dL    A/G Ratio 1.1 g/dL    BUN/Creatinine Ratio 11.9 7.0 - 25.0    Anion Gap 8.0 5.0 - 15.0 mmol/L   Lipase    Collection Time: 07/08/21  2:05 AM    Specimen: Blood   Result Value Ref Range    Lipase 40 13 - 60 U/L   Troponin    Collection Time: 07/08/21  2:05 AM    Specimen: Blood   Result Value Ref Range    Troponin T <0.010 0.000 - 0.030 ng/mL   Green Top (Gel)    Collection Time: 07/08/21  2:05 AM   Result Value Ref Range    Extra Tube Hold for add-ons.    Lavender Top    Collection Time: 07/08/21  2:05 AM   Result Value Ref Range    Extra Tube hold for add-on    Gold Top - SST    Collection Time: 07/08/21  2:05 AM   Result Value Ref Range    Extra Tube Hold for add-ons.    CBC Auto Differential    Collection Time: 07/08/21  2:05 AM    Specimen: Blood   Result Value Ref Range    WBC 7.53 3.40 - 10.80 10*3/mm3    RBC 4.89 3.77 - 5.28 10*6/mm3    Hemoglobin 13.8 12.0 - 15.9 g/dL    Hematocrit 43.8 34.0 - 46.6 %    MCV 89.6 79.0 - 97.0 fL    MCH 28.2 26.6 - 33.0 pg    MCHC 31.5 31.5 - 35.7 g/dL    RDW 13.7 12.3 - 15.4 %    RDW-SD 45.0 37.0 - 54.0 fl    MPV 11.1 6.0 - 12.0 fL    Platelets 254 140 - 450 10*3/mm3    Neutrophil % 71.3 42.7 - 76.0 %    Lymphocyte % 21.2 19.6 - 45.3 %    Monocyte % 5.8 5.0 - 12.0 %    Eosinophil % 1.2 0.3 - 6.2 %    Basophil % 0.4 0.0 - 1.5 %    Immature Grans % 0.1 0.0 - 0.5 %    Neutrophils, Absolute 5.36 1.70 - 7.00 10*3/mm3    Lymphocytes, Absolute 1.60 0.70 - 3.10 10*3/mm3    Monocytes, Absolute 0.44 0.10 - 0.90 10*3/mm3    Eosinophils, Absolute 0.09 0.00 - 0.40 10*3/mm3    Basophils, Absolute 0.03 0.00 - 0.20 10*3/mm3     Immature Grans, Absolute 0.01 0.00 - 0.05 10*3/mm3    nRBC 0.0 0.0 - 0.2 /100 WBC   Lactic Acid, Plasma    Collection Time: 07/08/21  2:05 AM    Specimen: Blood   Result Value Ref Range    Lactate 0.8 0.5 - 2.0 mmol/L   Urinalysis With Microscopic If Indicated (No Culture) - Urine, Clean Catch    Collection Time: 07/08/21  2:09 AM    Specimen: Urine, Clean Catch   Result Value Ref Range    Color, UA Yellow Yellow, Straw    Appearance, UA Cloudy (A) Clear    pH, UA 8.5 (H) 5.0 - 8.0    Specific Gravity, UA 1.026 1.001 - 1.030    Glucose, UA Negative Negative    Ketones, UA Trace (A) Negative    Bilirubin, UA Negative Negative    Blood, UA Negative Negative    Protein, UA Trace (A) Negative    Leuk Esterase, UA Trace (A) Negative    Nitrite, UA Negative Negative    Urobilinogen, UA 1.0 E.U./dL 0.2 - 1.0 E.U./dL   Urinalysis, Microscopic Only - Urine, Clean Catch    Collection Time: 07/08/21  2:09 AM    Specimen: Urine, Clean Catch   Result Value Ref Range    RBC, UA Too Numerous to Count (A) None Seen, 0-2 /HPF    WBC, UA 3-5 (A) None Seen, 0-2 /HPF    Bacteria, UA 1+ (A) None Seen, Trace /HPF    Squamous Epithelial Cells, UA 3-6 (A) None Seen, 0-2 /HPF    Methodology Manual Light Microscopy    COVID-19, [facility] IN-HOUSE,NASAL Swab (NO TRANSPORT MEDIA) 2 HR TAT - Swab, Nasopharynx    Collection Time: 07/08/21  4:59 AM    Specimen: Nasopharynx; Swab   Result Value Ref Range    COVID19 Presumptive Negative Presumptive Negative - Ref. Range     Note: In addition to lab results from this visit, the labs listed above may include labs taken at another facility or during a different encounter within the last 24 hours. Please correlate lab times with ED admission and discharge times for further clarification of the services performed during this visit.    CT Abdomen Pelvis Without Contrast   Final Result   1.  Proximal mechanical small bowel obstruction involving the jejunum just beyond the ligament of Treitz. Imaging findings  are highly suggestive of a closed-loop obstruction due to lesser omental sac hernia as detailed above.   2.  Remaining segments of small bowel and colon are normal in appearance. Normal appendix.      Signer Name: Primo Guerra MD    Signed: 7/8/2021 4:29 AM    Workstation Name: [email or web address]     [facility]iology [facility]      XR Chest 1 View   Final Result   No active disease.      Signer Name: Primo Guerra MD    Signed: 7/8/2021 2:41 AM    Workstation Name: [email or web address]     [facility]        Vitals:    07/08/21 0430 07/08/21 0445 07/08/21 0500 07/08/21 0530   BP: (!) 169/102  168/93 (!) 172/101   BP Location:       Patient Position:       Pulse:  72 79 65   Resp:       Temp:       TempSrc:       SpO2:  98% 99% 98%   Weight:       Height:         Medications   sodium chloride 0.9 % flush 10 mL (has no administration in time range)   ceFAZolin in dextrose (ANCEF) IVPB solution 2 g (has no administration in time range)   famotidine (PEPCID) injection 20 mg (20 mg Intravenous Given 7/8/21 0347)   aluminum-magnesium hydroxide-simethicone (MAALOX MAX) 400-400-40 MG/5ML suspension 15 mL (15 mL Oral Given 7/8/21 0347)   Lidocaine Viscous HCl (XYLOCAINE) 2 % mouth solution 15 mL (15 mL Mouth/Throat Given 7/8/21 0347)     ECG/EMG Results (last 24 hours)     Procedure Component Value Units Date/Time    ECG 12 Lead [[id number]] Collected: 07/08/21 0146     Updated: 07/08/21 0145        ECG 12 Lead                                                MDM  Number of Diagnoses or Management Options  Pain of upper abdomen: new and requires workup  Small bowel obstruction (CMS/HCC): new and requires workup  Risk of Complications, Morbidity, and/or Mortality  Presenting problems: high  Diagnostic procedures: moderate  Management options: moderate    Patient Progress  Patient progress: stable      Final diagnoses:   Pain of upper abdomen   Small bowel obstruction (CMS/HCC)       ED  Disposition  ED Disposition     ED Disposition Condition Comment    Send to OR            No follow-up provider specified.       Medication List      No changes were made to your prescriptions during this visit.          Joel Thompson PA  07/08/21 0608

## 2021-07-08 NOTE — CONSULTS
"Patient Name:  Jessika Singletary  YOB: 1960  [id number]       Patient Care Team:  Gaby Caballero MD as PCP - General (Family Medicine)      General Surgery Consult Note     Date of Consultation: 07/08/21    Consulting Physician : Amandeep Mcknight MD    Reason for Consult : Abdominal pain possible closed-loop obstruction    Subjective     I have been asked to see  Jessika Singletary , a 60 y.o. female in consultation for abdominal pain.  She reports a sudden onset of abdominal pain at 10 AM yesterday.  She states this was associated with nausea but no vomiting, though she did make herself vomit because she thought it would \"make her feel better.  She reports pain is sharp and located in the mid to upper abdomen.  She denies any associated fevers or chills.  She reports no change in her bowel habits, no bright red blood per rectum or melena.  Her last bowel movement was yesterday and normal.  Her last colonoscopy was last year and otherwise negative other than some small polyps.  She continued to worsen she presented to the emergency department.  Subsequent evaluation including CT scan was concerning for possible closed-loop bowel obstruction.  We have been asked to see her for possible surgical management.      Allergy: No Known Allergies    Medications:  ceFAZolin, 2 g, Intravenous, Once         No current facility-administered medications on file prior to encounter.     No current outpatient medications on file prior to encounter.       PMHx:   1.  Asthma    Past Surgical History:  1.  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    Tobacco use: Denies     EtOH use : Occasional   Illicit drug use: Denies      Review of Systems        Constitutional: No fevers, chills or malaise   Eyes: Denies visual changes    Cardiovascular: Denies chest pain, palpitations   Pulmonary: Denies cough or " "shortness of breath   Abdominal/ GI: See HPI    Genitourinary: Denies dysuria or hematuria   Musculoskeletal: Denies any but chronic joint aches, pains or deformities   Psychiatric: No recent mood changes   Neurologic: No paresthesias or loss of function          Objective     Physical Exam:      Vital Signs  BP (!) 172/101   Pulse 65   Temp 98.7 °F (37.1 °C) (Oral)   Resp 18   Ht 165.1 cm (65\")   Wt 90.7 kg (200 lb)   LMP  (LMP Unknown)   SpO2 98%   BMI 33.28 kg/m²   No intake or output data in the 24 hours ending 07/08/21 0603      Physical Exam:    Head: Normocephalic, atraumatic.   Eyes: Pupils equal, round, react to light and accommodation.  Mouth: Oral mucosa without lesions,   Neck: No masses, lymphadenopathy or carotid bruits bilaterally   CV: Rhythm  and rate regular , no  murmurs, rubs or gallops  Lungs: Clear  to auscultation bilaterally   Abdomen: Bowel sounds positive  , soft, tender to deep palpation in upper abdomen  Groin : No obvious hernias bilaterally   Extremities:  No cyanosis, clubbing or edema bilaterally   Lymphatics: No abnormal lymphadenopathy appreciated   Neurologic: No gross deficits       Results Review: I have personally reviewed all of the recent lab and imaging results available at this time.  Laboratory exam reveals a white count of 7.5 with a hemoglobin of 13.8 and a platelet count of 254.  Comprehensive metabolic panel essentially normal other than a mildly increased bilirubin of 1.5.  Creatinine is 1.01.  Troponin is less than 0.01.  Covid testing is negative.    CT scan of the abdomen pelvis was personally viewed by me as well as the final dictated and edited report.  There is evidence of distention of the proximal small bowel with sudden decompression of the more distal small bowel.  This is concerning for possible closed-loop obstruction of the proximal small bowel just distal to the ligament of Treitz.  There is some fecalization of the bowel contents in this " location.  There is a mild amount of mesenteric edema as well.  There is no free air or free fluid.  There is no evidence of other bowel obstruction noted.       Assessment/Plan     Assessment and Plan:    Problem List Items Addressed This Visit     None      Visit Diagnoses     Pain of upper abdomen    -  Primary    Small bowel obstruction (CMS/HCC)    - She appears to have an early closed-loop obstruction of the proximal small bowel.  I think the safest course of action would be emergent operation with laparoscopy possible laparotomy and bowel resection if needed.  I discussed the risk and benefits at length with the patient, who agrees to proceed.                 I discussed the patient's findings and my recommendations with the patient and/or family, as well as the primary team     Estevan Narayanan MD  07/08/21  06:03 EDT

## 2021-07-08 NOTE — ANESTHESIA PROCEDURE NOTES
Airway  Urgency: elective    Date/Time: 7/8/2021 7:22 AM  Airway not difficult    General Information and Staff    Patient location during procedure: OR  CRNA: Fay Watt CRNA    Indications and Patient Condition  Indications for airway management: airway protection    Preoxygenated: yes  MILS not maintained throughout  Mask difficulty assessment: 1 - vent by mask    Final Airway Details  Final airway type: endotracheal airway      Successful airway: ETT  Cuffed: yes   Successful intubation technique: RSI and video laryngoscopy  Facilitating devices/methods: intubating stylet and cricoid pressure  Endotracheal tube insertion site: oral  Blade: Jennie  Blade size: 3  ETT size (mm): 7.0  Cormack-Lehane Classification: grade I - full view of glottis  Placement verified by: chest auscultation and capnometry   Measured from: lips  ETT/EBT  to lips (cm): 20  Number of attempts at approach: 1  Assessment: lips, teeth, and gum same as pre-op and atraumatic intubation    Additional Comments  Negative epigastric sounds, Breath sound equal bilaterally with symmetric chest rise and fall    Elective deng-- emergent/RSI    Cricoid pressure maintained until ETCO2 confirmed

## 2021-07-08 NOTE — ANESTHESIA PREPROCEDURE EVALUATION
Anesthesia Evaluation     Patient summary reviewed and Nursing notes reviewed   no history of anesthetic complications:  NPO Solid Status: > 8 hours  NPO Liquid Status: > 2 hours           Airway   Mallampati: III  TM distance: >3 FB  Neck ROM: full  Possible difficult intubation  Dental - normal exam     Pulmonary    (+) shortness of breath, sleep apnea, decreased breath sounds,   Cardiovascular   Exercise tolerance: good (4-7 METS)    ECG reviewed  Rhythm: regular  Rate: normal        Neuro/Psych  GI/Hepatic/Renal/Endo    (+) morbid obesity,      Musculoskeletal     Abdominal   (+) obese,     Abdomen: soft.   Substance History      OB/GYN          Other   arthritis,                      Anesthesia Plan    ASA 3     general   Rapid sequence([place]quin)  intravenous induction     Anesthetic plan, all risks, benefits, and alternatives have been provided, discussed and informed consent has been obtained with: patient.    Plan discussed with CRNA.

## 2021-07-08 NOTE — PLAN OF CARE
Goal Outcome Evaluation:         VSS. Ambulated kulkarni. Gum at BS. Family at BS. C/o pain, PRN given. Resting

## 2021-07-08 NOTE — OP NOTE
OPERATIVE NOTE    Patient Name:  Jessika Singletary  YOB: 1960  [id number]    Date of Surgery:  7/8/2021      PREOPERATIVE DIAGNOSIS: Internal hernia with obstruction      POSTOPERATIVE DIAGNOSIS: Same        PROCEDURE PERFORMED: Diagnostic laparoscopy with lysis of adhesions       SURGEON: Estevan Narayanan MD       Circulator: Dorota Tijerina RN; Tracie Chapa RN  Scrub Person: Kervin De La Fuente; Pratima Hatch; Ricky Tony  Assistant: Linwood Adams PA-C       Assistant: Linwood Adams PA-C    SPECIMENS: None        ANESTHESIA: General.        FINDINGS:   1. Internal hernia from omentum causing closed-loop obstruction of the proximal small bowel able to be lysed completely.  The entire bowel was viable without need for resection.  No other obstructing locus.       INDICATIONS: The patient is a 60 y.o. female who presented with abdominal pain.  Subsequent evaluation was concerning for an obstructing internal hernia with closed-loop obstruction.  The risks and benefits of diagnostic laparoscopy, possible laparotomy, were discussed at length with the patient, who agreed to proceed.       DESCRIPTION OF PROCEDURE:      After obtaining informed consent, the patient was taken to the operating room and placed in supine  position. After appropriate DVT and antibiotic prophylaxis, general anesthesia was induced.  The abdomen  was prepped and draped in standard sterile fashion, and after infiltrating the skin with local anesthetic a 12 mm vertically oriented incision was made just inferior to the umbilicus.  Blunt dissection was carried to the base of the umbilicus which was grasped with a [place] clamp and elevated anteriorly.  A vertical midline incision the fascia was made.  A stay suture of 0 Vicryl was then placed in figure-of-eight fashion around the defect, and a blunt trocar advanced without difficulty into the abdominal cavity.  The abdomen was then insufflated with carbon dioxide gas to a  pressure of 15 mmHg.  The laparoscope was advanced the trocar and the jack contents were inspected.  There was no evidence of bowel, bladder, or visceral injury with entrance of the trocar.  There were adhesions of omentum both near the trocar and inferiorly.  After infiltrating the skin with local anesthetic, two 5 mm ports were placed along the anterior axillary line on the left side, one at the subcostal area, and the other at the level of the umbilicus.  Using these ports, the omental adhesions to the anterior abdominal wall were taken down using blunt and LigaSure dissection.  No bowel was encountered.  An additional 5 mm port was placed on the right abdomen and the anterior axillary line.    The omentum was then retracted superiorly.  The ligament of Treitz was clearly identified and the bowel run distally.  The previous internal hernia was identified and had been lysed with retraction of the omentum. There appeared to be a small internal hernia at the transverse colon mesentery that was opened using the LigaSure.  The previously entrapped portion was clearly identified and was completely viable.  The small bowel was then run from the ligament of Treitz to the ileocecal valve and was without injury or other obstructing source.  The ascending, transverse, and descending as well as sigmoid colons were inspected and normal.  The stomach was inspected and normal as was the gallbladder.      At this point the omentum was returned to anatomic positioning.  The abdomen was desufflated and all trochars removed under direct and laparoscopic visualization.  The fascia at the infraumbilical port site was closed using the previously placed 0 Vicryl suture.  The wounds were irrigated with saline until clear, and closed in each area using running subcuticular sutures.  The incisions were dressed in standard sterile fashion, and covered with a dry sterile dressing.    All sponge and needle counts were correct times two at  the completion of the procedure. The patient recovered from anesthesia, was extubated in the operating room  and was transported to the PACU  in stable condition.    Assistant: Linwood Adams PA-C  was responsible for performing the following activities: Retraction and Held/Positioned Camera and their skilled assistance was necessary for the success of this case.    Estevan Narayanan MD  7/8/2021  08:05 EDT

## 2021-07-08 NOTE — ANESTHESIA POSTPROCEDURE EVALUATION
Patient: Jessika Singletary    Procedure Summary     Date: 07/08/21 Room / Location:  [facility]    Anesthesia Start: 0710 Anesthesia Stop: 0824    Procedure: DIAGNOSTIC LAPAROSCOPY, LYSIS OF ADHESIONS (N/A Abdomen) Diagnosis:     Surgeons: Estevan Narayanan MD Provider: Linwood Villasenor MD    Anesthesia Type: general ASA Status: 3          Anesthesia Type: general    Vitals  Vitals Value Taken Time   /82 07/08/21 0823   Temp 98 °F (36.7 °C) 07/08/21 0823   Pulse 60 07/08/21 0824   Resp 12 07/08/21 0823   SpO2 100 % 07/08/21 0824   Vitals shown include unvalidated device data.        Post Anesthesia Care and Evaluation    Patient location during evaluation: PACU  Patient participation: complete - patient participated  Level of consciousness: awake and alert  Pain management: adequate  Airway patency: patent  Anesthetic complications: No anesthetic complications  PONV Status: none  Cardiovascular status: hemodynamically stable and acceptable  Respiratory status: nonlabored ventilation, acceptable and nasal cannula  Hydration status: acceptable

## 2021-07-08 NOTE — BRIEF OP NOTE
DIAGNOSTIC LAPAROSCOPY  Progress Note    Jessika Singletary  7/8/2021    Pre-op Diagnosis:   Internal hernia with obstruction       Post-Op Diagnosis Codes:  Same    Procedure/CPT® Codes:        Procedure(s):  DIAGNOSTIC LAPAROSCOPY, LYSIS OF ADHESIONS    Surgeon(s):  Estevan Narayanan MD    Anesthesia: General    Staff:   Circulator: Dorota Tijerina RN; Tracie Chapa RN  Scrub Person: Kervin De La Fuente; Pratima Hatch; Ricky Tony  Assistant: Linwood Adams PA-C  Assistant: Linwood Adams PA-C      Estimated Blood Loss: minimal    Urine Voided: * No values recorded between 7/8/2021  7:07 AM and 7/8/2021  8:04 AM *    Specimens:                None          Drains: * No LDAs found *    Findings:  1.  Internal hernia from omentum causing closed-loop obstruction of the proximal small bowel able to be lysed completely.  The entire bowel was viable without need for resection.  No other obstructing locus.    Complications: None    Assistant: Linwood Adams PA-C  was responsible for performing the following activities: Retraction, Closing and Held/Positioned Camera and their skilled assistance was necessary for the success of this case.    Estevan Narayanan MD     Date: 7/8/2021  Time: 08:04 EDT

## 2021-07-09 VITALS
OXYGEN SATURATION: 94 % | RESPIRATION RATE: 18 BRPM | SYSTOLIC BLOOD PRESSURE: 141 MMHG | HEART RATE: 70 BPM | WEIGHT: 200 LBS | HEIGHT: 65 IN | DIASTOLIC BLOOD PRESSURE: 77 MMHG | TEMPERATURE: 98.1 F | BODY MASS INDEX: 33.32 KG/M2

## 2021-07-09 LAB
BASOPHILS # BLD AUTO: 0.03 10*3/MM3 (ref 0–0.2)
BASOPHILS NFR BLD AUTO: 0.3 % (ref 0–1.5)
DEPRECATED RDW RBC AUTO: 46.1 FL (ref 37–54)
EOSINOPHIL # BLD AUTO: 0.08 10*3/MM3 (ref 0–0.4)
EOSINOPHIL NFR BLD AUTO: 0.7 % (ref 0.3–6.2)
ERYTHROCYTE [DISTWIDTH] IN BLOOD BY AUTOMATED COUNT: 13.5 % (ref 12.3–15.4)
HCT VFR BLD AUTO: 40.2 % (ref 34–46.6)
HGB BLD-MCNC: 12.2 G/DL (ref 12–15.9)
IMM GRANULOCYTES # BLD AUTO: 0.04 10*3/MM3 (ref 0–0.05)
IMM GRANULOCYTES NFR BLD AUTO: 0.4 % (ref 0–0.5)
LYMPHOCYTES # BLD AUTO: 2.18 10*3/MM3 (ref 0.7–3.1)
LYMPHOCYTES NFR BLD AUTO: 19.9 % (ref 19.6–45.3)
MCH RBC QN AUTO: 28.3 PG (ref 26.6–33)
MCHC RBC AUTO-ENTMCNC: 30.3 G/DL (ref 31.5–35.7)
MCV RBC AUTO: 93.3 FL (ref 79–97)
MONOCYTES # BLD AUTO: 0.54 10*3/MM3 (ref 0.1–0.9)
MONOCYTES NFR BLD AUTO: 4.9 % (ref 5–12)
NEUTROPHILS NFR BLD AUTO: 73.8 % (ref 42.7–76)
NEUTROPHILS NFR BLD AUTO: 8.09 10*3/MM3 (ref 1.7–7)
NRBC BLD AUTO-RTO: 0 /100 WBC (ref 0–0.2)
PLATELET # BLD AUTO: 216 10*3/MM3 (ref 140–450)
PMV BLD AUTO: 12.1 FL (ref 6–12)
RBC # BLD AUTO: 4.31 10*6/MM3 (ref 3.77–5.28)
WBC # BLD AUTO: 10.96 10*3/MM3 (ref 3.4–10.8)

## 2021-07-09 PROCEDURE — 25010000002 HEPARIN (PORCINE) PER 1000 UNITS: Performed by: SURGERY

## 2021-07-09 PROCEDURE — 25010000002 METOCLOPRAMIDE PER 10 MG: Performed by: SURGERY

## 2021-07-09 PROCEDURE — 85025 COMPLETE CBC W/AUTO DIFF WBC: CPT | Performed by: SURGERY

## 2021-07-09 PROCEDURE — G0378 HOSPITAL OBSERVATION PER HR: HCPCS

## 2021-07-09 RX ORDER — OXYCODONE HYDROCHLORIDE AND ACETAMINOPHEN 5; 325 MG/1; MG/1
1 TABLET ORAL EVERY 4 HOURS PRN
Qty: 11 TABLET | Refills: 0 | Status: SHIPPED | OUTPATIENT
Start: 2021-07-09 | End: 2021-07-18

## 2021-07-09 RX ORDER — PSEUDOEPHEDRINE HCL 30 MG
100 TABLET ORAL 2 TIMES DAILY PRN
Qty: 20 CAPSULE | Refills: 0 | Status: SHIPPED | OUTPATIENT
Start: 2021-07-09 | End: 2021-09-27

## 2021-07-09 RX ADMIN — OXYCODONE HYDROCHLORIDE AND ACETAMINOPHEN 1 TABLET: 5; 325 TABLET ORAL at 12:02

## 2021-07-09 RX ADMIN — METOCLOPRAMIDE 10 MG: 5 INJECTION, SOLUTION INTRAMUSCULAR; INTRAVENOUS at 05:15

## 2021-07-09 RX ADMIN — OXYCODONE HYDROCHLORIDE AND ACETAMINOPHEN 1 TABLET: 5; 325 TABLET ORAL at 04:16

## 2021-07-09 RX ADMIN — HEPARIN SODIUM 5000 UNITS: 5000 INJECTION INTRAVENOUS; SUBCUTANEOUS at 05:15

## 2021-07-09 RX ADMIN — HEPARIN SODIUM 5000 UNITS: 5000 INJECTION INTRAVENOUS; SUBCUTANEOUS at 13:50

## 2021-07-09 RX ADMIN — FAMOTIDINE 20 MG: 20 TABLET ORAL at 09:36

## 2021-07-09 RX ADMIN — BISACODYL 10 MG: 5 TABLET, COATED ORAL at 09:36

## 2021-07-09 RX ADMIN — METOCLOPRAMIDE 10 MG: 5 INJECTION, SOLUTION INTRAMUSCULAR; INTRAVENOUS at 12:02

## 2021-07-09 RX ADMIN — CETIRIZINE HYDROCHLORIDE 10 MG: 10 TABLET, FILM COATED ORAL at 09:36

## 2021-07-09 NOTE — PROGRESS NOTES
"Patient Name:  Jessika Singletary  YOB: 1960  [id number]    Surgery Progress Note    Date of visit: 7/9/2021    Subjective   Subjective: Feeling much better. Passing some gas, no nausea. Pain well controlled and improved overall.         Objective     Objective:     /86 (BP Location: Right arm, Patient Position: Lying)   Pulse 70   Temp 97.5 °F (36.4 °C) (Oral)   Resp 18   Ht 165.1 cm (65\")   Wt 90.7 kg (200 lb)   LMP  (LMP Unknown)   SpO2 96%   BMI 33.28 kg/m²     Intake/Output Summary (Last 24 hours) at 7/9/2021 0735  Last data filed at 7/9/2021 0700  Gross per 24 hour   Intake 1375 ml   Output 1250 ml   Net 125 ml       CV:  Rhythm  regular and rate regular   L:  Clear  to auscultation bilaterally   Abd:  Bowel sounds positive , soft, appropriately tender. Dressings c/d/i  Ext:  No cyanosis, clubbing, edema    Recent labs that are back at this time have been reviewed.        Assessment/Plan     Assessment/ Plan:    Problem List Items Addressed This Visit     None      Visit Diagnoses     Pain of upper abdomen    -  Primary    Small bowel obstruction (CMS/HCC)    - Doing well after laparoscopy. Advance diet as tolerated, HLIV. Possibly home later today.             Active Hospital Problems    Diagnosis  POA   • Obstructed internal hernia [K45.0]  Yes      Resolved Hospital Problems   No resolved problems to display.              Estevan Narayanan MD  7/9/2021  07:35 EDT      Did well today. Tolerating diet, passing gas, wants to go home.    D/C home. RTC with me in 2 weeks. No lifting > 30 lbs until RTC. May remove dressings in 24 hours, may shower at that time.    Estevan Narayanan MD  14:20 EDT      "
"Patient Name:  Jessika Singletary  YOB: 1960  [id number]    Surgery Post - Operative Note    Date of visit: 7/8/2021    Subjective   Subjective: Feeling much better, minimal pain, tolerating PO.       Objective     Objective:    /74 (BP Location: Left arm, Patient Position: Lying)   Pulse 82   Temp 97.8 °F (36.6 °C) (Oral)   Resp 18   Ht 165.1 cm (65\")   Wt 90.7 kg (200 lb)   LMP  (LMP Unknown)   SpO2 100%   BMI 33.28 kg/m²     CV:  Rate regular and rhythm  regular  L:  Clear  to auscultation bilaterally   ABD:  Soft, appropriately tender. Dressings  clean, dry and intact   EXT:  No cyanosis, clubbing or edema         Assessment/Plan     Assessment/ Plan: Doing well after Lap CARLITO. Continue Pulmonary toilet    Problem List Items Addressed This Visit     None      Visit Diagnoses     Pain of upper abdomen    -  Primary    Small bowel obstruction (CMS/HCC)               Active Hospital Problems    Diagnosis  POA   • Obstructed internal hernia [K45.0]  Yes      Resolved Hospital Problems   No resolved problems to display.            Estevan Narayanan MD  7/8/2021  20:38 EDT    "
Statement Selected

## 2021-07-09 NOTE — CASE MANAGEMENT/SOCIAL WORK
Case Management Discharge Note      Final Note: Pt plans to discharge home today.  She has her own car here and says she'll drive herself home.  Pt denies having any discharge needs.         Selected Continued Care - Admitted Since 7/8/2021     Destination    No services have been selected for the patient.              Durable Medical Equipment    No services have been selected for the patient.              Dialysis/Infusion    No services have been selected for the patient.              Home Medical Care    No services have been selected for the patient.              Therapy    No services have been selected for the patient.              Community Resources    No services have been selected for the patient.              Community & DME    No services have been selected for the patient.                  Transportation Services  Private: Car    Final Discharge Disposition Code: 01 - home or self-care

## 2021-07-09 NOTE — PLAN OF CARE
Goal Outcome Evaluation:  Plan of Care Reviewed With: patient        Progress: improving  Outcome Summary: VSS. UOP adequate. Incisions with scant drainage. C/o pain treated with PRN's. Denies nausea. IS use encouraged.

## 2021-07-19 LAB
QT INTERVAL: 374 MS
QTC INTERVAL: 439 MS

## 2021-09-27 ENCOUNTER — OFFICE VISIT (OUTPATIENT)
Dept: FAMILY MEDICINE CLINIC | Facility: CLINIC | Age: 61
End: 2021-09-27

## 2021-09-27 VITALS
WEIGHT: 201.6 LBS | HEART RATE: 84 BPM | RESPIRATION RATE: 16 BRPM | SYSTOLIC BLOOD PRESSURE: 115 MMHG | BODY MASS INDEX: 33.59 KG/M2 | DIASTOLIC BLOOD PRESSURE: 75 MMHG | HEIGHT: 65 IN | OXYGEN SATURATION: 97 %

## 2021-09-27 DIAGNOSIS — R09.89 ABNORMAL LUNG SOUNDS: Primary | ICD-10-CM

## 2021-09-27 DIAGNOSIS — R05.3 CHRONIC COUGH: ICD-10-CM

## 2021-09-27 PROBLEM — K45.0 OBSTRUCTED INTERNAL HERNIA: Status: RESOLVED | Noted: 2021-07-08 | Resolved: 2021-09-27

## 2021-09-27 PROBLEM — R73.03 PREDIABETES: Status: ACTIVE | Noted: 2020-07-01

## 2021-09-27 PROCEDURE — 99214 OFFICE O/P EST MOD 30 MIN: CPT | Performed by: FAMILY MEDICINE

## 2021-09-27 NOTE — PROGRESS NOTES
"Chief Complaint  Wheezing (started in March)    Subjective          Jessika Singletary presents to [facility]  History of Present Illness     Wheezing started in Feb/March of 2021. Gotten worse. Mostly at night when going to sleep. Has had a cough. Feels like something is stuck in upper chest and can't get it out. Feels that there is mucus in her throat but isn't coming out.     No known history of snoring. No prior history of allergies.     Denies chest pain, fever, or shortness of breath.     Takes zyrtec every day. Does not help with wheezing but helps with sneezing. Uses albuterol inhaler as she remembers. Not currently using fluticasone.     Never smoker.     Has not had any diagnostic repeat pulmonary function tests. Had chest Xray in July 2021.     She couldn't fit mouth on piece of PFT and not accurate.     She hears wheezing breathing in and out. No chest tightness or shortness of breath.         Objective   Vital Signs:   /75   Pulse 84   Resp 16   Ht 165.1 cm (65\")   Wt 91.4 kg (201 lb 9.6 oz)   SpO2 97%   BMI 33.55 kg/m²     Physical Exam  Vitals reviewed.   Constitutional:       General: She is not in acute distress.     Appearance: She is not ill-appearing.   Cardiovascular:      Rate and Rhythm: Normal rate and regular rhythm.      Heart sounds: Normal heart sounds.   Pulmonary:      Effort: Pulmonary effort is normal. No respiratory distress.      Breath sounds: Examination of the right-lower field reveals rales. Examination of the left-lower field reveals rales. Rales present. No wheezing or rhonchi.   Chest:      Chest wall: No tenderness.   Neurological:      Mental Status: She is alert.          Result Review :   The following data was reviewed by: Ciara Box MD on 09/27/2021:            CXR 6/17/21:normal  XR Chest 1 View (07/08/2021 02:28)    PFTS 8/28/21: non-diagnostic    Office visit 8/5/21: prediabetes, wheezing    Assessment and Plan "    Diagnoses and all orders for this visit:    1. Abnormal lung sounds (Primary)  -     CT Chest Without Contrast; Future  -     Ambulatory Referral to Pulmonology    2. Chronic cough  -     CT Chest Without Contrast; Future  -     Ambulatory Referral to Pulmonology      Patient has had a chronic cough with reported wheezing which on exam reveals rails.  Other investigation with CT chest and work-up with pulmonology specialist, likely will try to repeat PFTs.  At this time she did not receive relief with steroid inhalers or allergy medications and advised to discontinue them.  Recommend keeping an albuterol inhaler for as needed if she were to develop shortness of breath or chest tightness.      Follow Up   Return if symptoms worsen or fail to improve.  Patient was given instructions and counseling regarding her condition or for health maintenance advice. Please see specific information pulled into the AVS if appropriate.     Aviva Hughes MS3    I saw and evaluated the patient. I discussed the case with the medical student and agree with the findings and plan as documented. I personally performed the Exam and Medical Decision Making. Critical elements of the physical exam and MDM are documented above.    Electronically signed by Ciara Box MD, 09/27/21, 11:07 AM EDT.

## 2021-10-25 ENCOUNTER — HOSPITAL ENCOUNTER (OUTPATIENT)
Dept: CT IMAGING | Facility: HOSPITAL | Age: 61
Discharge: HOME | End: 2021-10-25
Admitting: FAMILY MEDICINE

## 2021-10-25 DIAGNOSIS — R09.89 ABNORMAL LUNG SOUNDS: ICD-10-CM

## 2021-10-25 DIAGNOSIS — R05.3 CHRONIC COUGH: ICD-10-CM

## 2021-10-25 PROCEDURE — 71250 CT THORAX DX C-: CPT

## 2021-11-01 ENCOUNTER — TRANSCRIBE ORDERS (OUTPATIENT)
Dept: ADMINISTRATIVE | Facility: HOSPITAL | Age: 61
End: 2021-11-01

## 2021-11-01 DIAGNOSIS — Z12.31 VISIT FOR SCREENING MAMMOGRAM: Primary | ICD-10-CM

## 2021-11-19 DIAGNOSIS — Z01.812 BLOOD TESTS PRIOR TO TREATMENT OR PROCEDURE: Primary | ICD-10-CM

## 2021-11-22 ENCOUNTER — CLINICAL SUPPORT NO REQUIREMENTS (OUTPATIENT)
Dept: PULMONOLOGY | Facility: CLINIC | Age: 61
End: 2021-11-22

## 2021-11-22 DIAGNOSIS — Z01.812 BLOOD TESTS PRIOR TO TREATMENT OR PROCEDURE: ICD-10-CM

## 2021-11-22 PROCEDURE — 99211 OFF/OP EST MAY X REQ PHY/QHP: CPT | Performed by: INTERNAL MEDICINE

## 2021-11-22 PROCEDURE — U0004 COV-19 TEST NON-CDC HGH THRU: HCPCS | Performed by: INTERNAL MEDICINE

## 2021-11-23 ENCOUNTER — HOSPITAL ENCOUNTER (OUTPATIENT)
Dept: MAMMOGRAPHY | Facility: HOSPITAL | Age: 61
Discharge: HOME | End: 2021-11-23
Admitting: INTERNAL MEDICINE

## 2021-11-23 DIAGNOSIS — Z12.31 VISIT FOR SCREENING MAMMOGRAM: ICD-10-CM

## 2021-11-23 LAB — SARS-COV-2 RNA NOSE QL NAA+PROBE: NOT DETECTED

## 2021-11-23 PROCEDURE — 77063 BREAST TOMOSYNTHESIS BI: CPT | Performed by: RADIOLOGY

## 2021-11-23 PROCEDURE — 77067 SCR MAMMO BI INCL CAD: CPT

## 2021-11-23 PROCEDURE — 77067 SCR MAMMO BI INCL CAD: CPT | Performed by: RADIOLOGY

## 2021-11-23 PROCEDURE — 77063 BREAST TOMOSYNTHESIS BI: CPT

## 2021-11-24 ENCOUNTER — OFFICE VISIT (OUTPATIENT)
Dept: PULMONOLOGY | Facility: CLINIC | Age: 61
End: 2021-11-24

## 2021-11-24 VITALS
DIASTOLIC BLOOD PRESSURE: 88 MMHG | HEART RATE: 75 BPM | OXYGEN SATURATION: 92 % | HEIGHT: 65 IN | BODY MASS INDEX: 33.99 KG/M2 | SYSTOLIC BLOOD PRESSURE: 144 MMHG | TEMPERATURE: 98 F | RESPIRATION RATE: 16 BRPM | WEIGHT: 204 LBS

## 2021-11-24 DIAGNOSIS — R06.2 WHEEZING: Primary | ICD-10-CM

## 2021-11-24 PROCEDURE — 94729 DIFFUSING CAPACITY: CPT | Performed by: INTERNAL MEDICINE

## 2021-11-24 PROCEDURE — 94726 PLETHYSMOGRAPHY LUNG VOLUMES: CPT | Performed by: INTERNAL MEDICINE

## 2021-11-24 PROCEDURE — 99204 OFFICE O/P NEW MOD 45 MIN: CPT | Performed by: INTERNAL MEDICINE

## 2021-11-24 PROCEDURE — 94010 BREATHING CAPACITY TEST: CPT | Performed by: INTERNAL MEDICINE

## 2021-11-24 NOTE — PROGRESS NOTES
"CC:    wheezing    HPI:    60 y/o AAF w/ h/o recent SBO requiring surgery in July, lifetime non-smoker, who has had episodic wheezing waking her up at night since the Spring.  Initially this was quite alarming but has improved to some degree.  She does exercise, and does not notice any wheezing, chest tightness, shortness of breath,etc.  No typical sounding asthmatic triggers.  No GERD symptoms.  When she wakes up she is not short of breath but can hear an audible \"wheezing\" sound coming from upper airway.  Hasn't had overt allergies.  Has had some dry cough and feels like she can't get it up.  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    PMH:    Past Medical History:   Diagnosis Date   • Allergic rhinitis    • Asthma 2/1/21    wheezing   • Prediabetes    • Small bowel obstruction (HCC) 07/08/2021    DIAGNOSTIC LAPAROSCOPY, LYSIS OF ADHESIONS   • Vitamin D deficiency      PSH:    Past Surgical History:   Procedure Laterality Date   • COLONOSCOPY     • DIAGNOSTIC LAPAROSCOPY N/A 7/8/2021    Procedure: DIAGNOSTIC LAPAROSCOPY, LYSIS OF ADHESIONS;  Surgeon: Estevan Narayanan MD;  Location: [facility];  Service: General;  Laterality: N/A;   • HYSTERECTOMY     • OOPHORECTOMY      unilateral     FH:    Family History   Problem Relation Age of Onset   • Breast cancer Paternal Aunt    • Hypertension Mother    • Stroke Mother    • Diabetes Grandchild    • Arthritis Father    • Ovarian cancer Neg Hx    • Colon cancer Neg Hx    • Heart attack Neg Hx      SH:    Social History     Socioeconomic History   • Marital status: Single   Tobacco Use   • Smoking status: Never Smoker   • Smokeless tobacco: Never Used   Vaping Use   • Vaping Use: Never used   Substance and Sexual Activity   • Alcohol use: No   • Drug use: Never   • Sexual activity: Not Currently     Partners: Male     Birth control/protection: None     ALLERGIES:    No Known Allergies  MEDICATIONS:      Current Outpatient Medications:   •  albuterol " "sulfate  (90 Base) MCG/ACT inhaler, Inhale 2 puffs Every 6 (Six) Hours As Needed., Disp: , Rfl:   ROS:  Per HPI, otherwise all systems reviewed and negative.    DIAGNOSTIC DATA (Reviewed and interpreted by me unless otherwise specified):    CT Chest 10/25/21 - scattered granulomas otherwise normal appearing lung parenchyma    PFT 11/24/21 - mild restriction, no obstruction, mild reduction in DLCO overcorrects, low ERV.  Overall \"normal\" PFT when accounting for BMI / body habitus.    Vitals:    11/24/21 1433   BP: 144/88   Pulse: 75   Resp: 16   Temp: 98 °F (36.7 °C)   SpO2: 92%       Physical Exam   Constitutional: Oriented to person, place, and time. Appears well-developed and well-nourished.   Head: Normocephalic and atraumatic.   Nose: Nose normal.   Mouth/Throat: Oropharynx with cobblestonning  Eyes: Conjunctivae are normal.  Pupils normal.  Neck: No tracheal deviation present.   Cardiovascular: Normal rate, regular rhythm, normal heart sounds and intact distal pulses.  Exam reveals no gallop and no friction rub.  No thrill.  No JVD.  No edema.  No murmur heard.  Pulmonary/Chest: Effort normal and breath sounds normal.  No tenderness to palpation.  No clubbing.   Abdominal: Soft. Bowel sounds are normal. No distension. No tenderness. There is no guarding.   Musculoskeletal: Normal range of motion.  No tenderness.  Lymphadenopathy:  No cervical adenopathy.   Neurological:  No new focal neurological deficits observed   Skin: Skin is warm and dry. No rash noted.   Psychiatric: Normal mood and affect.  Behavior is normal. Judgment normal.    Assessment/Plan     1)  Nocturnal Wheezing  2)  Chronic Cough    Overall suspect allergic rhinitis w/ post nasal gtt or GERD is waking her up at night and irritating her vocal cords causing some vocal cord spasm / dysfunction.  Start with trial of flonase.  Can add allegra and sinus irrigation prn.  If this doesn't help after 2-4 weeks do trial of Prilosec and NPO 3 hours " before bedtime.  PFT and CT Chest are re-assuring.  Her nodules look like granulomas, and I don't think any follow up is required in a lifetime non-smoker.  Doubt asthma.    If no improvement come back in 3 months and we can re-assess.    Sachin Cordova MD  Pulmonology and Critical Care Medicine  11/24/21 15:09 EST  Electronically Signed    C.C.:  Ciara Lang, Gabi Box, Ciara PAREDES MD

## 2022-04-04 ENCOUNTER — OFFICE VISIT (OUTPATIENT)
Dept: FAMILY MEDICINE CLINIC | Facility: CLINIC | Age: 62
End: 2022-04-04

## 2022-04-04 VITALS
OXYGEN SATURATION: 98 % | WEIGHT: 204.6 LBS | SYSTOLIC BLOOD PRESSURE: 162 MMHG | HEART RATE: 91 BPM | DIASTOLIC BLOOD PRESSURE: 86 MMHG | HEIGHT: 65 IN | TEMPERATURE: 98.2 F | BODY MASS INDEX: 34.09 KG/M2

## 2022-04-04 DIAGNOSIS — J06.9 ACUTE URI: Primary | ICD-10-CM

## 2022-04-04 DIAGNOSIS — R05.9 COUGH: ICD-10-CM

## 2022-04-04 DIAGNOSIS — R03.0 ELEVATED BLOOD PRESSURE READING: ICD-10-CM

## 2022-04-04 LAB
EXPIRATION DATE: NORMAL
FLUAV AG NPH QL: NEGATIVE
FLUBV AG NPH QL: NEGATIVE
INTERNAL CONTROL: NORMAL
Lab: NORMAL

## 2022-04-04 PROCEDURE — U0004 COV-19 TEST NON-CDC HGH THRU: HCPCS | Performed by: FAMILY MEDICINE

## 2022-04-04 PROCEDURE — 99213 OFFICE O/P EST LOW 20 MIN: CPT | Performed by: FAMILY MEDICINE

## 2022-04-04 PROCEDURE — 87804 INFLUENZA ASSAY W/OPTIC: CPT | Performed by: FAMILY MEDICINE

## 2022-04-04 RX ORDER — BENZONATATE 200 MG/1
200 CAPSULE ORAL 3 TIMES DAILY PRN
Qty: 30 CAPSULE | Refills: 0 | Status: SHIPPED | OUTPATIENT
Start: 2022-04-04 | End: 2022-04-14

## 2022-04-04 RX ORDER — AMOXICILLIN AND CLAVULANATE POTASSIUM 875; 125 MG/1; MG/1
1 TABLET, FILM COATED ORAL 2 TIMES DAILY
Qty: 14 TABLET | Refills: 0 | Status: SHIPPED | OUTPATIENT
Start: 2022-04-04 | End: 2022-04-11

## 2022-04-04 NOTE — PROGRESS NOTES
"Chief Complaint  Cough (Patient states she had a rapid negative covid test yesterday, but would like to get another test today. ) and Chills    Subjective          Jessika Singletary presents to [facility]  Cough  Associated symptoms include chills, myalgias, rhinorrhea and wheezing. Pertinent negatives include no ear pain, sore throat or shortness of breath.   Chills  Associated symptoms include chills, coughing and myalgias. Pertinent negatives include no congestion or sore throat.     Symptom onset 4/1/22 diarrhea, lost voice, cough,  Aches. She has chest congestion. Productive cough, yellow. Not much wheezing. Not measured temperature, but didn't feel warm. Normal appetite. No known sick contacts. She had COVID test 4/2/22 was negative.           Review of Systems   Constitutional: Positive for chills. Negative for appetite change.   HENT: Positive for rhinorrhea and voice change. Negative for congestion, ear pain and sore throat.    Eyes: Positive for discharge.   Respiratory: Positive for cough and wheezing. Negative for chest tightness and shortness of breath.    Musculoskeletal: Positive for myalgias.         Objective   Vital Signs:   /86   Pulse 91   Temp 98.2 °F (36.8 °C)   Ht 165.1 cm (65\")   Wt 92.8 kg (204 lb 9.6 oz)   SpO2 98%   BMI 34.05 kg/m²            Physical Exam  Vitals reviewed.   Constitutional:       General: She is not in acute distress.     Appearance: She is obese. She is not ill-appearing or diaphoretic.   HENT:      Right Ear: Tympanic membrane and ear canal normal.      Left Ear: Tympanic membrane and ear canal normal.      Nose: Mucosal edema and congestion present. No rhinorrhea.      Right Sinus: No maxillary sinus tenderness or frontal sinus tenderness.      Left Sinus: No maxillary sinus tenderness or frontal sinus tenderness.      Mouth/Throat:      Mouth: Mucous membranes are moist. No oral lesions.      Pharynx: No pharyngeal swelling, " oropharyngeal exudate, posterior oropharyngeal erythema or uvula swelling.      Tonsils: No tonsillar exudate or tonsillar abscesses.      Comments: Strained voice  Eyes:      General:         Right eye: No discharge.         Left eye: No discharge.   Cardiovascular:      Rate and Rhythm: Normal rate and regular rhythm.   Pulmonary:      Effort: Pulmonary effort is normal.      Breath sounds: Normal breath sounds.   Lymphadenopathy:      Head:      Right side of head: No submandibular, tonsillar, preauricular or posterior auricular adenopathy.      Left side of head: No submandibular, tonsillar, preauricular or posterior auricular adenopathy.      Cervical: No cervical adenopathy.   Neurological:      Mental Status: She is alert.   Psychiatric:         Mood and Affect: Mood normal.        Result Review :   The following data was reviewed by: Ciara Box MD on 04/04/2022:  Influenza A&B    Common Labsle 4/4/22   Rapid Influenza A Ag Negative   Rapid Influenza B Ag Negative                     Assessment and Plan    Diagnoses and all orders for this visit:    1. Acute URI (Primary)  -     benzonatate (TESSALON) 200 MG capsule; Take 1 capsule by mouth 3 (Three) Times a Day As Needed for Cough for up to 10 days.  Dispense: 30 capsule; Refill: 0  -     amoxicillin-clavulanate (Augmentin) 875-125 MG per tablet; Take 1 tablet by mouth 2 (Two) Times a Day for 7 days.  Dispense: 14 tablet; Refill: 0  New.  Rapid influenza negative.  Send out Covid test pending.  Work note provided for 2 days.  If her symptoms no not improved by a week recommend starting antibiotic and prescription printed for patient.  Tessalon Perles for symptom management.  2. Cough  -     COVID-19 PCR, [facility], NP SWAB IN [facility]SP[facility]/ORAL SWISH 24-30 HR TAT - Swab, Nasopharynx; Future  -     POC Influenza A / B    3. Elevated blood pressure reading  Discussed with patient blood pressure reading is elevated today.  I  recommend that she checks her blood pressure possibly at work when she is not sick.      Follow Up   No follow-ups on file.  Patient was given instructions and counseling regarding her condition or for health maintenance advice. Please see specific information pulled into the AVS if appropriate.     Electronically signed by Ciara Box MD, 04/04/22, 10:37 AM EDT.

## 2022-04-05 LAB — SARS-COV-2 RNA NOSE QL NAA+PROBE: NOT DETECTED

## 2022-05-06 ENCOUNTER — OFFICE VISIT (OUTPATIENT)
Dept: FAMILY MEDICINE CLINIC | Facility: CLINIC | Age: 62
End: 2022-05-06

## 2022-05-06 VITALS
OXYGEN SATURATION: 99 % | HEIGHT: 65 IN | BODY MASS INDEX: 34.49 KG/M2 | HEART RATE: 88 BPM | DIASTOLIC BLOOD PRESSURE: 90 MMHG | SYSTOLIC BLOOD PRESSURE: 134 MMHG | WEIGHT: 207 LBS

## 2022-05-06 DIAGNOSIS — R10.2 PELVIC PAIN: Primary | ICD-10-CM

## 2022-05-06 DIAGNOSIS — R19.09 INGUINAL SWELLING: ICD-10-CM

## 2022-05-06 LAB
BILIRUB BLD-MCNC: NEGATIVE MG/DL
CLARITY, POC: ABNORMAL
COLOR UR: YELLOW
EXPIRATION DATE: ABNORMAL
GLUCOSE UR STRIP-MCNC: NEGATIVE MG/DL
KETONES UR QL: NEGATIVE
LEUKOCYTE EST, POC: NEGATIVE
Lab: ABNORMAL
NITRITE UR-MCNC: NEGATIVE MG/ML
PH UR: 6 [PH] (ref 5–8)
PROT UR STRIP-MCNC: NEGATIVE MG/DL
RBC # UR STRIP: NEGATIVE /UL
SP GR UR: 1.01 (ref 1–1.03)
UROBILINOGEN UR QL: NORMAL

## 2022-05-06 PROCEDURE — 81003 URINALYSIS AUTO W/O SCOPE: CPT | Performed by: FAMILY MEDICINE

## 2022-05-06 PROCEDURE — 99213 OFFICE O/P EST LOW 20 MIN: CPT | Performed by: FAMILY MEDICINE

## 2022-05-06 NOTE — PROGRESS NOTES
"Chief Complaint  Pelvic Pain (Discomfort, ongoing for almost 2 weeks )    Subjective          Jessika Singletary presents to [facility]  Pelvic Pain  The patient's primary symptoms include pelvic pain.   Answers for HPI/ROS submitted by the patient on 5/6/2022  Please describe your symptoms.: discomfort pelvic area right side somewhat swollen  Have you had these symptoms before?: No  How long have you been having these symptoms?: 5-7 days  Please list any medications you are currently taking for this condition.: none  Please describe any probable cause for these symptoms. : maybe mowing the lawn  What is the primary reason for your visit?: Other    Right groin irritation and swollen. No erythema or bruising. If she sits down and gets up it is irritating. No known injury. She has used a push mower. No prior episodes.         Objective   Vital Signs:  /90   Pulse 88   Ht 165.1 cm (65\")   Wt 93.9 kg (207 lb)   SpO2 99%   BMI 34.45 kg/m²           Physical Exam  Constitutional:       General: She is not in acute distress.     Appearance: She is not ill-appearing, toxic-appearing or diaphoretic.   Pulmonary:      Effort: No respiratory distress.   Abdominal:      Hernia: A hernia is present. Hernia is present in the right inguinal area.            Result Review :   The following data was reviewed by: Ciara Box MD on 05/06/2022:               Results for orders placed or performed in visit on 05/06/22   POCT urinalysis dipstick, automated    Specimen: Urine   Result Value Ref Range    Color Yellow Yellow, Straw, Dark Yellow, [place]    Clarity, UA Cloudy (A) Clear    Specific Gravity  1.010 1.005 - 1.030    pH, Urine 6.0 5.0 - 8.0    Leukocytes Negative Negative    Nitrite, UA Negative Negative    Protein, POC Negative Negative mg/dL    Glucose, UA Negative Negative, 1000 mg/dL (3+) mg/dL    Ketones, UA Negative Negative    Urobilinogen, UA Normal Normal    Bilirubin " Negative Negative    Blood, UA Negative Negative    Lot Number [id number]     Expiration Date 10-21-23        Assessment and Plan    Diagnoses and all orders for this visit:    1. Pelvic pain (Primary)  -     POCT urinalysis dipstick, automated  No signs of UTI.  2. Inguinal swelling  -     US Nonvascular Extremity Limited; Future  -     US Abdomen Limited; Future  New. Concern for hernia we will further evaluate with ultrasound to the area.  Then will refer to surgeon if hernia confirmed.         Follow Up   Return if symptoms worsen or fail to improve.  Patient was given instructions and counseling regarding her condition or for health maintenance advice. Please see specific information pulled into the AVS if appropriate.     Electronically signed by Ciara Box MD, 05/06/22, 3:57 PM EDT.

## 2022-05-09 ENCOUNTER — TELEPHONE (OUTPATIENT)
Dept: FAMILY MEDICINE CLINIC | Facility: CLINIC | Age: 62
End: 2022-05-09

## 2022-05-10 ENCOUNTER — TELEPHONE (OUTPATIENT)
Dept: FAMILY MEDICINE CLINIC | Facility: CLINIC | Age: 62
End: 2022-05-10

## 2022-05-10 ENCOUNTER — OFFICE VISIT (OUTPATIENT)
Dept: PULMONOLOGY | Facility: CLINIC | Age: 62
End: 2022-05-10

## 2022-05-10 VITALS
WEIGHT: 205.4 LBS | SYSTOLIC BLOOD PRESSURE: 138 MMHG | BODY MASS INDEX: 34.22 KG/M2 | OXYGEN SATURATION: 98 % | HEIGHT: 65 IN | TEMPERATURE: 97.5 F | HEART RATE: 84 BPM | DIASTOLIC BLOOD PRESSURE: 76 MMHG

## 2022-05-10 DIAGNOSIS — R05.8 UPPER AIRWAY COUGH SYNDROME: Primary | ICD-10-CM

## 2022-05-10 PROCEDURE — 99213 OFFICE O/P EST LOW 20 MIN: CPT | Performed by: INTERNAL MEDICINE

## 2022-05-10 NOTE — TELEPHONE ENCOUNTER
Caller: Jessika Singletary     Relationship: Self     Best call back number: [phone]     What form or medical record are you requesting: WORK EXCUSE     Who is requesting this form or medical record from you: PT     How would you like to receive the form or medical records (pick-up, mail, fax):   If fax, what is the fax number:   If mail, what is the address:      [address] [facility] [address]  PT ALSO WILL LIKE TO HAVE EXCUSE SENT TO HER [employer]     If pick-up, provide patient with address and location details     Timeframe paperwork needed:      Additional notes:   PT REQUEST WORK EXCUSE FOR APPT 5/6 Friday

## 2022-05-11 NOTE — TELEPHONE ENCOUNTER
Printed and mailed copy called and left vm for patient letting her know and that a copy was uploaded to [employer].

## 2022-05-11 NOTE — TELEPHONE ENCOUNTER
Letter submitted through [employer].  Please also mail or  at patient's request in previous message.

## 2022-06-11 ENCOUNTER — HOSPITAL ENCOUNTER (OUTPATIENT)
Dept: ULTRASOUND IMAGING | Facility: HOSPITAL | Age: 62
Discharge: HOME | End: 2022-06-11

## 2022-06-11 DIAGNOSIS — R19.09 INGUINAL SWELLING: ICD-10-CM

## 2022-06-11 PROCEDURE — 76705 ECHO EXAM OF ABDOMEN: CPT

## 2022-07-15 ENCOUNTER — OFFICE VISIT (OUTPATIENT)
Dept: OBSTETRICS AND GYNECOLOGY | Facility: CLINIC | Age: 62
End: 2022-07-15

## 2022-07-15 VITALS
BODY MASS INDEX: 34.42 KG/M2 | DIASTOLIC BLOOD PRESSURE: 80 MMHG | SYSTOLIC BLOOD PRESSURE: 140 MMHG | HEIGHT: 65 IN | WEIGHT: 206.6 LBS

## 2022-07-15 DIAGNOSIS — Z53.21 PATIENT LEFT WITHOUT BEING SEEN: Primary | ICD-10-CM

## 2022-07-15 NOTE — PROGRESS NOTES
GYN Annual Exam     CC - Gynecological exam     Subjective   HPI  Jessika Singletary is a 61 y.o. female, , who presents for a gyn exam.  She is postmenopausal. She underwent a JAGDISH/LSO in her 30's.  In  Dr. Narayanan performed dx lap with lysis of adhesions for an internal hernia with obstruction.  Partner Status: Marital Status: single and is not currently sexually active.  Pt denies urinary incontinence.    She is here today for evaluation and a second opinion.  She noticed swelling in her (R) groin approx. 3 months ago.  Occasionally it is uncomfortable.                Last mammogram: wnl in   Last Completed Mammogram          MAMMOGRAM (Every 2 Years) Next due on 2023  Mammo Screening Digital Tomosynthesis Bilateral With CAD    2020  Mammo Screening Digital Tomosynthesis Bilateral With CAD    09/10/2019  Mammo Screening Digital Tomosynthesis Bilateral With CAD    2018  Mammo Screening Digital Tomosynthesis Bilateral With CAD    2017  Mammo Screening Digital Tomosynthesis Bilateral With CAD    Only the first 5 history entries have been loaded, but more history exists.              Last colonoscopy: removal of polyps in   Last Completed Colonoscopy          COLORECTAL CANCER SCREENING (COLONOSCOPY - Every 10 Years) Next due on 2030  SCANNED - COLONOSCOPY              Last DEXA: 5-10 yrs ago wnl per pt    Last Pap :  or  and was wnl   Last Completed Pap Smear     This patient has no relevant Health Maintenance data.        History of abnormal Pap smear: no    Additional OB/GYN History   Family history of uterine, colon, breast, or ovarian cancer: yes - paternal aunt had breast cancer   Performs monthly Self-Breast Exam: yes - ***  Exercises Regularly: no  Feelings of Anxiety or Depression: no    Tobacco Usage?: No   OB History        1    Para   1    Term   1            AB        Living   1       SAB        IAB         "Ectopic        Molar        Multiple        Live Births   1                Health Maintenance   Topic Date Due   • ZOSTER VACCINE (1 of 2) Never done   • ANNUAL PHYSICAL  06/16/2017   • HEPATITIS C SCREENING  Never done   • Annual Gynecologic Pelvic and Breast Exam  07/01/2018   • PAP SMEAR  06/15/2019   • COVID-19 Vaccine (2 - Pfizer series) 11/05/2021   • TDAP/TD VACCINES (4 - Td or Tdap) 11/21/2021   • INFLUENZA VACCINE  10/01/2022   • MAMMOGRAM  11/23/2023   • COLORECTAL CANCER SCREENING  11/24/2030   • Pneumococcal Vaccine 0-64  Aged Out       The additional following portions of the patient's history were reviewed and updated as appropriate: allergies, current medications, past family history, past medical history, past social history, past surgical history and problem list.    Review of Systems    I have reviewed and agree with the HPI, ROS, and historical information as entered above. Priscila Castañeda MA    Objective   /80   Ht 165.1 cm (65\")   Wt 93.7 kg (206 lb 9.6 oz)   LMP  (LMP Unknown)   BMI 34.38 kg/m²     Physical Exam      Assessment & Plan     No diagnosis found.    {LOSASSPLAN 47-60  [facility]:62284::\"Recommended use of Vitamin D replacement and getting adequate calcium in her diet. (1500mg).   \",\"Reviewed HPV guidelines\",\"Reviewed monthly self breast exams.  Instructed to call with lumps, pain, or breast discharge.  Yearly mammograms ordered.\",\"RTC in 1 year or PRN with problems\"}    Priscila Castañeda MA   07/15/2022    The patient left the office {Time of Visit:08948} care was provided and did not complete the visit {LWBS Reason:38328}.   The patient left the office {Time of Visit:16006} care was provided and did not complete the visit {LWBS Reason:23168}. The patient left the office {Time of Visit:07448} care was provided and did not complete the visit {LWBS Reason:85914}.   "

## 2022-08-18 ENCOUNTER — TELEMEDICINE (OUTPATIENT)
Dept: FAMILY MEDICINE CLINIC | Facility: CLINIC | Age: 62
End: 2022-08-18

## 2022-08-18 ENCOUNTER — OFFICE VISIT (OUTPATIENT)
Dept: OBSTETRICS AND GYNECOLOGY | Facility: CLINIC | Age: 62
End: 2022-08-18

## 2022-08-18 ENCOUNTER — PATIENT MESSAGE (OUTPATIENT)
Dept: FAMILY MEDICINE CLINIC | Facility: CLINIC | Age: 62
End: 2022-08-18

## 2022-08-18 VITALS
WEIGHT: 208 LBS | HEIGHT: 65 IN | DIASTOLIC BLOOD PRESSURE: 60 MMHG | BODY MASS INDEX: 34.66 KG/M2 | SYSTOLIC BLOOD PRESSURE: 124 MMHG

## 2022-08-18 DIAGNOSIS — Z01.419 PAP TEST, AS PART OF ROUTINE GYNECOLOGICAL EXAMINATION: Primary | ICD-10-CM

## 2022-08-18 DIAGNOSIS — M25.511 ACUTE PAIN OF RIGHT SHOULDER: Primary | ICD-10-CM

## 2022-08-18 DIAGNOSIS — R19.00 MASS OF SOFT TISSUE OF ABDOMEN: ICD-10-CM

## 2022-08-18 DIAGNOSIS — M19.019 DJD OF AC (ACROMIOCLAVICULAR) JOINT: ICD-10-CM

## 2022-08-18 PROCEDURE — 99213 OFFICE O/P EST LOW 20 MIN: CPT | Performed by: FAMILY MEDICINE

## 2022-08-18 PROCEDURE — 99386 PREV VISIT NEW AGE 40-64: CPT | Performed by: OBSTETRICS & GYNECOLOGY

## 2022-08-18 NOTE — PROGRESS NOTES
Gynecologic Annual Exam Note        GYN Annual Exam     CC - Here for annual exam.        HPI  Jessika Singletary is a 61 y.o. female, , who presents for annual well woman exam as a established patient.  She is s/p JAGDISH/LSO in  for fibroids.. Denies vaginal bleeding.  Patient reports problems with: none. There were no changes to her medical or surgical history since her last visit.. Partner Status: Marital Status: single.  She is is not currently sexually active. STD testing recommendations have been explained to the patient and she does not desire STD testing.    Additional OB/GYN History       Last Pap : 5/15/2016. Results: negative. HPV: not done  Last Completed Pap Smear     This patient has no relevant Health Maintenance data.        History of abnormal Pap smear: no  Family history of uterine, colon, breast, or ovarian cancer: yes - paternal aunt with breast CA  Performs monthly Self-Breast Exam: yes  Last mammogram: 2021. Done at .    Last Completed Mammogram          MAMMOGRAM (Every 2 Years) Next due on 2023  Mammo Screening Digital Tomosynthesis Bilateral With CAD    2020  Mammo Screening Digital Tomosynthesis Bilateral With CAD    09/10/2019  Mammo Screening Digital Tomosynthesis Bilateral With CAD    2018  Mammo Screening Digital Tomosynthesis Bilateral With CAD    2017  Mammo Screening Digital Tomosynthesis Bilateral With CAD    Only the first 5 history entries have been loaded, but more history exists.              Last colonoscopy: has had a colonoscopy 2 year(s) ago.    Last Completed Colonoscopy          COLORECTAL CANCER SCREENING (COLONOSCOPY - Every 10 Years) Next due on 2030  SCANNED - COLONOSCOPY                  Last bone density scan (DEXA): None  Exercises Regularly: yes  Feelings of Anxiety or Depression: no      Tobacco Usage?: No     No current outpatient medications on file.    Patient denies the need for  "medication refills today.    OB History        1    Para   1    Term   1            AB        Living   1       SAB        IAB        Ectopic        Molar        Multiple        Live Births   1                Past Medical History:   Diagnosis Date   • Allergic rhinitis    • Prediabetes    • Small bowel obstruction (HCC) 2021    DIAGNOSTIC LAPAROSCOPY, LYSIS OF ADHESIONS   • Vitamin D deficiency         Past Surgical History:   Procedure Laterality Date   • COLONOSCOPY     • DIAGNOSTIC LAPAROSCOPY N/A 2021    Procedure: DIAGNOSTIC LAPAROSCOPY, LYSIS OF ADHESIONS;  Surgeon: Estevan Narayanan MD;  Location: [facility];  Service: General;  Laterality: N/A;   • HYSTERECTOMY      abdominal hysterectomy with (L) oophorectomy; fibroids   • OOPHORECTOMY      unilateral       Health Maintenance   Topic Date Due   • ZOSTER VACCINE (1 of 2) Never done   • ANNUAL PHYSICAL  2017   • HEPATITIS C SCREENING  Never done   • Annual Gynecologic Pelvic and Breast Exam  2018   • PAP SMEAR  06/15/2019   • COVID-19 Vaccine (2 - Pfizer series) 2021   • TDAP/TD VACCINES (3 - Td or Tdap) 2021   • INFLUENZA VACCINE  10/01/2022   • MAMMOGRAM  2023   • COLORECTAL CANCER SCREENING  2030   • Pneumococcal Vaccine 0-64  Aged Out       The additional following portions of the patient's history were reviewed and updated as appropriate: allergies, current medications, past family history, past medical history, past social history and past surgical history.    Review of Systems    I have reviewed and agree with the HPI, ROS, and historical information as entered above. Yesenia Villegas MD    Objective   /60   Ht 165.1 cm (65\")   Wt 94.3 kg (208 lb)   LMP  (LMP Unknown)   BMI 34.61 kg/m²     Physical Exam  Vitals and nursing note reviewed. Exam conducted with a chaperone present.   Constitutional:       Appearance: She is well-developed.   HENT:      Head: Normocephalic and " atraumatic.   Neck:      Thyroid: No thyroid mass or thyromegaly.   Cardiovascular:      Rate and Rhythm: Normal rate and regular rhythm.      Heart sounds: No murmur heard.  Pulmonary:      Effort: Pulmonary effort is normal. No retractions.      Breath sounds: Normal breath sounds. No wheezing, rhonchi or rales.   Chest:      Chest wall: No mass or tenderness.   Breasts:      Right: Normal. No mass, nipple discharge, skin change or tenderness.      Left: Normal. No mass, nipple discharge, skin change or tenderness.       Abdominal:      General: Bowel sounds are normal.      Palpations: Abdomen is soft. Abdomen is not rigid. There is no mass.      Tenderness: There is no abdominal tenderness. There is no guarding.      Hernia: No hernia is present. There is no hernia in the left inguinal area.          Comments: Soft tissue mass   Genitourinary:     Labia:         Right: No rash, tenderness or lesion.         Left: No rash, tenderness or lesion.       Vagina: Normal. No vaginal discharge or lesions.      Cervix: No cervical motion tenderness, discharge, lesion or cervical bleeding.      Uterus: Normal. Not enlarged, not fixed and not tender.       Adnexa:         Right: No mass or tenderness.          Left: No mass or tenderness.        Rectum: No external hemorrhoid.   Musculoskeletal:      Cervical back: Normal range of motion. No muscular tenderness.   Neurological:      Mental Status: She is alert and oriented to person, place, and time.   Psychiatric:         Behavior: Behavior normal.            Assessment and Plan    Problem List Items Addressed This Visit    None     Visit Diagnoses     Pap test, as part of routine gynecological examination    -  Primary    Relevant Orders    LIQUID-BASED PAP SMEAR, [facility] ([facility])    Mass of soft tissue of abdomen        Relevant Orders    Ambulatory Referral to General Surgery (Completed)          GYN annual well woman exam.   Reviewed monthly self breast exams.   Instructed to call with lumps, pain, or breast discharge.  Yearly mammograms ordered.  Ordered mammogram today.  Recommended use of Vitamin D and getting adequate calcium in her diet. (1500mg)  Osteoporosis screening ordered today.  Reviewed exercise as a preventative health measures.   Reviewed BMI and weight loss as preventative health measures.   Colonoscopy recommended.  RTC in 1 year or PRN with problems.  Other: will refer to see Dr Narayanan about her mass and he did her hernia   No follow-ups on file.     Yesenia Villegas MD  08/18/2022

## 2022-08-18 NOTE — PROGRESS NOTES
Chief Complaint  Arm Pain    Subjective         Jessika Singletary presents to [facility]  History of Present Illness       She did cold compress for right arm pain. She also took ibuprofen. Intermittent pain. Right shoulder. Lifting arm has soreness. Worse at night. Onset in July. No known injury. She has tried new things in the gym. Worse using dumbbell weights. No h/o shoulder problems.     Objective   Vital Signs:   There were no vitals taken for this visit.    Physical Exam   Constitutional: No distress.   Pulmonary/Chest:  No respiratory distress.  Musculoskeletal:      Comments: Right shoulder increased with cross body.  Full AB duction without pain.   Skin: She is not diaphoretic.   Psychiatric: She has a normal mood and affect.     Result Review :            Urgent Care Provider Note by Scott Ge MD (08/12/2022 09:46)  XR Humerus Right (08/12/2022 09:58)       Assessment and Plan    Diagnoses and all orders for this visit:    1. Acute pain of right shoulder (Primary)  -     Ambulatory Referral to Physical Therapy Evaluate and treat    2. DJD of AC (acromioclavicular) joint    New. Reviewed x-ray findings with patient discussing AC joint arthritis.  I have concern also with rotator cuff tendinitis.  Start physical therapy.  Discussed changing NSAIDs to meloxicam but she prefers to continue ibuprofen.  Follow-up in 1 month if not improved and may need to consider MRI.      Follow Up   Return in about 1 month (around 9/18/2022) for Office visit shoulder pain.  Patient was given instructions and counseling regarding her condition or for health maintenance advice. Please see specific information pulled into the AVS if appropriate.     Mode of Visit: Video  Location of patient: other: office  Location of provider: [facility]  You have chosen to receive care through a telehealth visit.  The patient has signed the video visit consent form.  The visit included audio and video  interaction. No technical issues occurred during this visit.     Electronically signed by Ciara Box MD, 08/18/22, 7:48 AM EDT.

## 2022-08-19 LAB — REF LAB TEST METHOD: NORMAL

## 2022-08-24 ENCOUNTER — TELEPHONE (OUTPATIENT)
Dept: OBSTETRICS AND GYNECOLOGY | Facility: CLINIC | Age: 62
End: 2022-08-24

## 2022-09-23 ENCOUNTER — TREATMENT (OUTPATIENT)
Dept: PHYSICAL THERAPY | Facility: CLINIC | Age: 62
End: 2022-09-23

## 2022-09-23 DIAGNOSIS — G89.29 CHRONIC RIGHT SHOULDER PAIN: Primary | ICD-10-CM

## 2022-09-23 DIAGNOSIS — M25.511 CHRONIC RIGHT SHOULDER PAIN: Primary | ICD-10-CM

## 2022-09-23 PROCEDURE — 97110 THERAPEUTIC EXERCISES: CPT | Performed by: PHYSICAL THERAPIST

## 2022-09-23 PROCEDURE — 97161 PT EVAL LOW COMPLEX 20 MIN: CPT | Performed by: PHYSICAL THERAPIST

## 2022-09-23 NOTE — PROGRESS NOTES
Physical Therapy Initial Evaluation and Plan of Care    Patient: Jessika Singletary   : 1960  Diagnosis/ICD-10 Code:  Chronic right shoulder pain [M25.511, G89.29]  Referring practitioner: Ciara Tay*  Date of Initial Visit: 2022  Today's Date: 2022  Patient seen for 1 session         Visit Diagnoses:    ICD-10-CM ICD-9-CM   1. Chronic right shoulder pain  M25.511 719.41    G89.29 338.29         Subjective Evaluation    History of Present Illness  Mechanism of injury: Pt states that she has been dealing with issues with the right arm since April/May and she was having a lot of pain on the outside of the right upper arm and reported that the pain was sharp. She thinks it may have started when she was mowing some tall grass in her lawn and was having to push more and restart the  several times. Her pain is more widespread now and she has some issues with being able to move the arm some days and she has difficulty getting into a comfortable position when sleeping (right side sleeper). She does feel like the arm has gotten more stiff recently and she has started to limit activity with the right arm due to the pain.       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  Quality of life: good    Pain  Current pain ratin  At best pain ratin  At worst pain rating: 10  Location: right upper arm and top of the right shoulder   Quality: dull ache and sharp (sharp with certain movements)  Relieving factors: medications, rest and change in position (advil)  Aggravating factors: overhead activity, outstretched reach and lifting (reaching into her fridge or behind her back)  Progression: no change    Hand dominance: left    Diagnostic Tests  X-ray: abnormal (moderate AC joint arthritis)    Treatments  No previous or current treatments  Patient Goals  Patient goals for therapy: decreased pain, increased motion and increased  strength             Objective          Palpation     Additional Palpation Details  TTP noted at the anterior right GH joint with hypertonicity and tenderness at he right deltoid and upper trap     Active Range of Motion   Left Shoulder   Flexion: 163 degrees   Extension: 39 degrees   Abduction: 144 degrees   External rotation 0°: 58 degrees   Internal rotation BTB: T9     Right Shoulder   Flexion: 123 degrees   Extension: 26 degrees   Abduction: 96 degrees   External rotation 0°: 36 degrees   Internal rotation BTB: sacrum     Passive Range of Motion     Right Shoulder   Flexion: 130 degrees   Abduction: 100 degrees   External rotation 45°: 65 degrees   Internal rotation 45°: 80 degrees     Strength/Myotome Testing     Right Shoulder     Planes of Motion   Flexion: 4+   Abduction: 4   External rotation at 0°: 4+   Internal rotation at 0°: 5     Tests   Cervical     Right   Negative active compression ([place]).     Right Shoulder   Positive empty can and Hawkin's.   Negative full can.           Assessment & Plan     Assessment  Impairments: abnormal muscle tone, abnormal or restricted ROM, activity intolerance, impaired physical strength, lacks appropriate home exercise program and pain with function  Functional Limitations: carrying objects, lifting, pulling, pushing, uncomfortable because of pain, reaching behind back, reaching overhead and unable to perform repetitive tasks  Assessment details: Patient is a 61 year old female who comes to physical therapy with c/o pain in the right shoulder. Signs and symptoms are consistent with right shoulder impingement with possible RTC pathology resulting in pain, decreased ROM, decreased strength, and inability to perform all essential functional activities. Pt will benefit from skilled PT services to address the above issues.     Prognosis details: SHORT TERM GOALS:     2 weeks  1. Pt I w/ HEP  2. Pt to demonstrate PROM of the right shoulder to WFL to improve ability to  perform ADL's  3. Pt to demonstrate ability to perform 30 minutes continuous activity in the clinic without increase in pain     LONG TERM GOALS:   6 weeks  1. Pt to demonstrate AROM of the right shoulder to WNL to allow ability to perform all necessary functional activities  2. Pt to demonstrate ability to lift 5# OH with the right arm without increase in pain  3. Pt to report being able to work full duty without increase in pain in the shoulder  4. Pt to tolerate 60 minutes continuous activity in the clinic without increase in pain       Plan  Therapy options: will be seen for skilled therapy services  Planned modality interventions: cryotherapy, electrical stimulation/Russian stimulation, high voltage pulsed current (pain management), iontophoresis, microcurrent electrical stimulation, TENS, thermotherapy (hydrocollator packs) and ultrasound  Planned therapy interventions: abdominal trunk stabilization, ADL retraining, body mechanics training, flexibility, functional ROM exercises, home exercise program, IADL retraining, joint mobilization, manual therapy, neuromuscular re-education, postural training, soft tissue mobilization, spinal/joint mobilization, strengthening, stretching and therapeutic activities  Frequency: 2x week  Duration in weeks: 12  Treatment plan discussed with: patient              Timed:         Manual Therapy:         mins  90111;     Therapeutic Exercise:    24     mins  19852;     Neuromuscular Oracio:        mins  76932;    Therapeutic Activity:          mins  19689;     Gait Training:           mins  34250;     Ultrasound:          mins  73577;    Ionto                                   mins   40085  Self Care                            mins   92886  Canalith Repos         mins 15164      Un-Timed:  Electrical Stimulation:         mins  94541 ( );  Dry Needling          mins self-pay  Traction          mins 12029  Low Eval     24     Mins  19295  Mod Eval          Mins  56012  High  Eval                            Mins  18125        Timed Treatment:   24   mins   Total Treatment:     48   mins          PT: Brandon Geiger PT, DPT, OCS, Cert. DN   License Number: [id number]  Electronically signed by Brandon Geiger PT, 09/23/22, 10:36 AM EDT    Certification Period: 9/23/2022 thru 12/21/2022  I certify that the therapy services are furnished while this patient is under my care.  The services outlined above are required by this patient, and will be reviewed every 90 days.         Physician Signature:__________________________________________________    PHYSICIAN: Ciara Ruff MD  NPI: [id number]                                      DATE:      Please sign and return via fax to [email or web address] . Thank you, [facility].

## 2022-10-06 ENCOUNTER — TREATMENT (OUTPATIENT)
Dept: PHYSICAL THERAPY | Facility: CLINIC | Age: 62
End: 2022-10-06

## 2022-10-06 DIAGNOSIS — G89.29 CHRONIC RIGHT SHOULDER PAIN: Primary | ICD-10-CM

## 2022-10-06 DIAGNOSIS — M25.511 CHRONIC RIGHT SHOULDER PAIN: Primary | ICD-10-CM

## 2022-10-06 PROCEDURE — 97112 NEUROMUSCULAR REEDUCATION: CPT | Performed by: PHYSICAL THERAPIST

## 2022-10-06 PROCEDURE — 97110 THERAPEUTIC EXERCISES: CPT | Performed by: PHYSICAL THERAPIST

## 2022-10-06 PROCEDURE — 97140 MANUAL THERAPY 1/> REGIONS: CPT | Performed by: PHYSICAL THERAPIST

## 2022-10-12 ENCOUNTER — TRANSCRIBE ORDERS (OUTPATIENT)
Dept: ADMINISTRATIVE | Facility: HOSPITAL | Age: 62
End: 2022-10-12

## 2022-10-12 DIAGNOSIS — K46.0 NON-RECURRENT ABDOMINAL HERNIA WITH OBSTRUCTION WITHOUT GANGRENE, UNSPECIFIED HERNIA TYPE: Primary | ICD-10-CM

## 2022-10-13 NOTE — PROGRESS NOTES
Physical Therapy Daily Treatment Note  [facility]  [address] [address], [address] [place] [address]      Patient: Jessika Singletary   : 1960  Referring practitioner: Ciara Tay*  Date of Initial Visit: Type: THERAPY  Noted: 2022  Today's Date: 10/13/2022  Patient seen for 2 sessions    Certification Period 10/13/2022 thru 1/10/2023       Visit Diagnoses:    ICD-10-CM ICD-9-CM   1. Chronic right shoulder pain  M25.511 719.41    G89.29 338.29       Subjective     Pt states that she feels some better when she does the exercises and stretches throughout the day, but she continues to have pain when she is working and reaching overhead.     Objective   See Exercise, Manual, and Modality Logs for complete treatment.       Assessment/Plan     Pt was able to perform exercise in the clinic today without an exacerbation of symptoms and she demonstrated an improvement in her shoulder PROM in all planes. Will cont to progress as indicated.       Timed:         Manual Therapy:    23     mins  48999;     Therapeutic Exercise:    15     mins  26336;     Neuromuscular Oracio:    15    mins  21687;    Therapeutic Activity:          mins  16329;     Gait Training:           mins  74927;     Ultrasound:          mins  11203;    Ionto                                   mins   82008  Self Care                            mins   26430  Canalith Repos         mins 15890  Electrical Stimulation:         mins  95325    Un-Timed:  Electrical Stimulation:         mins  98611 ( );  Dry Needling          mins self-pay  Traction          mins 55049      Timed Treatment:   53   mins   Total Treatment:     53   mins    Brandon Geiger PT, DPT, OCS, Cert. DN  [place] License: [id number]

## 2022-10-14 ENCOUNTER — TREATMENT (OUTPATIENT)
Dept: PHYSICAL THERAPY | Facility: CLINIC | Age: 62
End: 2022-10-14

## 2022-10-14 DIAGNOSIS — M25.511 CHRONIC RIGHT SHOULDER PAIN: Primary | ICD-10-CM

## 2022-10-14 DIAGNOSIS — G89.29 CHRONIC RIGHT SHOULDER PAIN: Primary | ICD-10-CM

## 2022-10-14 PROCEDURE — 97110 THERAPEUTIC EXERCISES: CPT | Performed by: PHYSICAL THERAPIST

## 2022-10-14 PROCEDURE — 97112 NEUROMUSCULAR REEDUCATION: CPT | Performed by: PHYSICAL THERAPIST

## 2022-10-14 PROCEDURE — 97140 MANUAL THERAPY 1/> REGIONS: CPT | Performed by: PHYSICAL THERAPIST

## 2022-10-18 ENCOUNTER — HOSPITAL ENCOUNTER (OUTPATIENT)
Dept: CT IMAGING | Facility: HOSPITAL | Age: 62
Discharge: HOME | End: 2022-10-18
Admitting: SURGERY

## 2022-10-18 DIAGNOSIS — K46.0 NON-RECURRENT ABDOMINAL HERNIA WITH OBSTRUCTION WITHOUT GANGRENE, UNSPECIFIED HERNIA TYPE: ICD-10-CM

## 2022-10-18 PROCEDURE — 74176 CT ABD & PELVIS W/O CONTRAST: CPT

## 2022-10-19 NOTE — PROGRESS NOTES
Physical Therapy Daily Treatment Note    [facility]   [address] [address], [address] [place] [address]      Patient: Jessika Singletary   : 1960  Referring practitioner: Ciara Tay*  Date of Initial Visit: Type: THERAPY  Noted: 2022  Today's Date: 10/19/2022  Patient seen for 3 sessions    Certification Period 10/19/2022 thru 2023       Visit Diagnoses:    ICD-10-CM ICD-9-CM   1. Chronic right shoulder pain  M25.511 719.41    G89.29 338.29       Subjective     Pt states that she feels like she has noticed some improvement since beginning therapy and she has had slgihtly less pain when working.     Objective   See Exercise, Manual, and Modality Logs for complete treatment.       Assessment/Plan     Pt is progressing well with therapy and she demonstrates an improvement in her shoulder ROM and tolerance to activity. Will cont to progress as indicated.       Timed:         Manual Therapy:    14     mins  77442;     Therapeutic Exercise:    14     mins  22288;     Neuromuscular Oracio:    28    mins  59365;    Therapeutic Activity:          mins  06938;     Gait Training:           mins  33670;     Ultrasound:          mins  44771;    Ionto                                   mins   69728  Self Care                            mins   03581  Canalith Repos         mins 81548  Electrical Stimulation:         mins  17685    Un-Timed:  Electrical Stimulation:         mins  69744 ( );  Dry Needling          mins self-pay  Traction          mins 86255      Timed Treatment:   56   mins   Total Treatment:     56   mins    Brandon Geiger, PT, DPT, OCS, Cert. DN  [place] License: [id number]

## 2022-10-24 ENCOUNTER — TRANSCRIBE ORDERS (OUTPATIENT)
Dept: FAMILY MEDICINE CLINIC | Facility: CLINIC | Age: 62
End: 2022-10-24

## 2022-10-24 ENCOUNTER — TREATMENT (OUTPATIENT)
Dept: PHYSICAL THERAPY | Facility: CLINIC | Age: 62
End: 2022-10-24

## 2022-10-24 DIAGNOSIS — G89.29 CHRONIC RIGHT SHOULDER PAIN: Primary | ICD-10-CM

## 2022-10-24 DIAGNOSIS — Z12.31 ENCOUNTER FOR SCREENING MAMMOGRAM FOR BREAST CANCER: Primary | ICD-10-CM

## 2022-10-24 DIAGNOSIS — M25.511 CHRONIC RIGHT SHOULDER PAIN: Primary | ICD-10-CM

## 2022-10-24 PROCEDURE — 97110 THERAPEUTIC EXERCISES: CPT | Performed by: PHYSICAL THERAPIST

## 2022-10-24 PROCEDURE — 97140 MANUAL THERAPY 1/> REGIONS: CPT | Performed by: PHYSICAL THERAPIST

## 2022-10-24 PROCEDURE — 97112 NEUROMUSCULAR REEDUCATION: CPT | Performed by: PHYSICAL THERAPIST

## 2022-10-26 ENCOUNTER — OFFICE VISIT (OUTPATIENT)
Dept: FAMILY MEDICINE CLINIC | Facility: CLINIC | Age: 62
End: 2022-10-26

## 2022-10-26 VITALS
HEIGHT: 65 IN | SYSTOLIC BLOOD PRESSURE: 110 MMHG | DIASTOLIC BLOOD PRESSURE: 70 MMHG | WEIGHT: 204.8 LBS | OXYGEN SATURATION: 95 % | HEART RATE: 79 BPM | BODY MASS INDEX: 34.12 KG/M2

## 2022-10-26 DIAGNOSIS — M75.81 TENDINITIS OF RIGHT ROTATOR CUFF: Primary | ICD-10-CM

## 2022-10-26 PROCEDURE — 99214 OFFICE O/P EST MOD 30 MIN: CPT | Performed by: FAMILY MEDICINE

## 2022-10-26 RX ORDER — PREDNISONE 20 MG/1
40 TABLET ORAL DAILY
Qty: 14 TABLET | Refills: 0 | Status: SHIPPED | OUTPATIENT
Start: 2022-10-26 | End: 2022-11-02

## 2022-10-26 NOTE — PROGRESS NOTES
"Chief Complaint  Arm Pain (Pain in right arm after physical therapy Monday)    Subjective        Jessika Singletary presents to [facility]  History of Present Illness     Onset of right shoulder pain in July.  She had evaluation at urgent care in August along with x-ray.  Her most recent visit with me telemedicine 8/18/2022 ordered physical therapy. After last PT session the exercise increased pain to right shoulder. Worse pain at night. Left hand dominant. 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 This morning she could hardly lift her arm.     Objective   Vital Signs:  /70   Pulse 79   Ht 165.1 cm (65\")   Wt 92.9 kg (204 lb 12.8 oz)   SpO2 95%   BMI 34.08 kg/m²   Estimated body mass index is 34.08 kg/m² as calculated from the following:    Height as of this encounter: 165.1 cm (65\").    Weight as of this encounter: 92.9 kg (204 lb 12.8 oz).          Physical Exam  Constitutional:       General: She is not in acute distress.     Appearance: She is not ill-appearing, toxic-appearing or diaphoretic.   Pulmonary:      Effort: Pulmonary effort is normal. No respiratory distress.   Musculoskeletal:      Right shoulder: Tenderness ( subacromial ) present. No deformity. Decreased range of motion.      Comments: Reduced range of motion and pain with forward flexion, AB duction, cross body.   Psychiatric:         Mood and Affect: Mood normal.        Result Review :  The following data was reviewed by: Ciara Ashley MD on 10/26/2022:             XR Humerus Right (08/12/2022 09:58)  Progress Notes by Brandon Geiger PT (10/14/2022 10:30)    Assessment and Plan   Diagnoses and all orders for this visit:    1. Tendinitis of right rotator cuff (Primary)  -     MRI Shoulder Right Without Contrast; Future  -     predniSONE (DELTASONE) 20 MG tablet; Take 2 tablets by mouth Daily for 7 days.  Dispense: 14 tablet; Refill: 0    Worsening pain.  She " has started physical therapy but now more severe symptoms and new limited range of motion.  I have prescribed prednisone to treat the inflammation and pain.  Also recommend proceeding with MRI to see if there is any evidence of rotator cuff tear.  Follow-up after imaging.  Work note provided for today and tomorrow.         Follow Up   No follow-ups on file.  Patient was given instructions and counseling regarding her condition or for health maintenance advice. Please see specific information pulled into the AVS if appropriate.     Electronically signed by Ciara Ashley MD, 10/26/22, 4:35 PM EDT.

## 2022-10-28 NOTE — PROGRESS NOTES
Physical Therapy Daily Treatment Note    [facility]   [address] [facility] [address], [address] [place] [address]      Patient: Jessika Singletary   : 1960  Referring practitioner: Ciara Ashley MD  Date of Initial Visit: Type: THERAPY  Noted: 2022  Today's Date: 10/27/2022  Patient seen for 4 sessions    Certification Period 10/27/2022 thru 2023       Visit Diagnoses:    ICD-10-CM ICD-9-CM   1. Chronic right shoulder pain  M25.511 719.41    G89.29 338.29       Subjective     Pt states that she feels like she has made some progress with therapy and she has slightly less pain with prolonged use of the arm while working.     Objective   See Exercise, Manual, and Modality Logs for complete treatment.       Assessment/Plan     Pt able to tolerate more exercise in the clinic and she has less pain with manual treatment. Will cont to progress as indicated.       Timed:         Manual Therapy:    13     mins  59841;     Therapeutic Exercise:    13     mins  43278;     Neuromuscular Oracio:    28    mins  17698;    Therapeutic Activity:          mins  98798;     Gait Training:           mins  47670;     Ultrasound:          mins  34835;    Ionto                                   mins   24098  Self Care                            mins   71738  Canalith Repos         mins 32082  Electrical Stimulation:         mins  35868    Un-Timed:  Electrical Stimulation:         mins  62225 ( );  Dry Needling          mins self-pay  Traction          mins 12622      Timed Treatment:   54   mins   Total Treatment:     54   mins    Brandon Geiger, PT, DPT, OCS, Cert. DN  [place] License: [id number]

## 2022-10-31 ENCOUNTER — TREATMENT (OUTPATIENT)
Dept: PHYSICAL THERAPY | Facility: CLINIC | Age: 62
End: 2022-10-31

## 2022-10-31 DIAGNOSIS — M25.511 CHRONIC RIGHT SHOULDER PAIN: Primary | ICD-10-CM

## 2022-10-31 DIAGNOSIS — G89.29 CHRONIC RIGHT SHOULDER PAIN: Primary | ICD-10-CM

## 2022-10-31 PROCEDURE — 97140 MANUAL THERAPY 1/> REGIONS: CPT | Performed by: PHYSICAL THERAPIST

## 2022-10-31 PROCEDURE — 97110 THERAPEUTIC EXERCISES: CPT | Performed by: PHYSICAL THERAPIST

## 2022-10-31 PROCEDURE — 97112 NEUROMUSCULAR REEDUCATION: CPT | Performed by: PHYSICAL THERAPIST

## 2022-11-09 ENCOUNTER — TREATMENT (OUTPATIENT)
Dept: PHYSICAL THERAPY | Facility: CLINIC | Age: 62
End: 2022-11-09

## 2022-11-09 DIAGNOSIS — G89.29 CHRONIC RIGHT SHOULDER PAIN: Primary | ICD-10-CM

## 2022-11-09 DIAGNOSIS — M25.511 CHRONIC RIGHT SHOULDER PAIN: Primary | ICD-10-CM

## 2022-11-09 PROCEDURE — 97140 MANUAL THERAPY 1/> REGIONS: CPT | Performed by: PHYSICAL THERAPIST

## 2022-11-09 PROCEDURE — 97110 THERAPEUTIC EXERCISES: CPT | Performed by: PHYSICAL THERAPIST

## 2022-11-09 PROCEDURE — 97112 NEUROMUSCULAR REEDUCATION: CPT | Performed by: PHYSICAL THERAPIST

## 2022-11-14 ENCOUNTER — TREATMENT (OUTPATIENT)
Dept: PHYSICAL THERAPY | Facility: CLINIC | Age: 62
End: 2022-11-14

## 2022-11-14 DIAGNOSIS — M25.511 CHRONIC RIGHT SHOULDER PAIN: Primary | ICD-10-CM

## 2022-11-14 DIAGNOSIS — G89.29 CHRONIC RIGHT SHOULDER PAIN: Primary | ICD-10-CM

## 2022-11-14 PROCEDURE — 97140 MANUAL THERAPY 1/> REGIONS: CPT | Performed by: PHYSICAL THERAPIST

## 2022-11-14 PROCEDURE — 97112 NEUROMUSCULAR REEDUCATION: CPT | Performed by: PHYSICAL THERAPIST

## 2022-11-14 PROCEDURE — 97110 THERAPEUTIC EXERCISES: CPT | Performed by: PHYSICAL THERAPIST

## 2022-11-14 NOTE — PROGRESS NOTES
Physical Therapy Daily Treatment Note    [facility]   [address] [facility], [address] [place] [address]      Patient: Jessika Singletary   : 1960  Referring practitioner: Ciara Ashley MD  Date of Initial Visit: Type: THERAPY  Noted: 2022  Today's Date: 2022  Patient seen for 6 sessions    Certification Period 2022 thru 2023       Visit Diagnoses:    ICD-10-CM ICD-9-CM   1. Chronic right shoulder pain  M25.511 719.41    G89.29 338.29       Subjective     Pt states that she is feeling some soreness in the shoulder, but she feels that she is starting to notice some decrease in her overall pain.     Objective   See Exercise, Manual, and Modality Logs for complete treatment.       Assessment/Plan     Decreased intensity of maunal therapy in the clinic today and pt was able to tolerate more exercise in the clinic without exacerbation of symptoms.       Timed:         Manual Therapy:    14     mins  91393;     Therapeutic Exercise:    10     mins  47621;     Neuromuscular Oracio:    30    mins  69864;    Therapeutic Activity:          mins  57272;     Gait Training:           mins  73682;     Ultrasound:          mins  02205;    Ionto                                   mins   86357  Self Care                            mins   25260  Canalith Repos         mins 48975  Electrical Stimulation:         mins  47333    Un-Timed:  Electrical Stimulation:         mins  60182 ( );  Dry Needling          mins self-pay  Traction          mins 48889      Timed Treatment:   54   mins   Total Treatment:     54   mins    Brandon Geiger, PT, DPT, OCS, Cert. DN  [place] License: [id number]

## 2022-11-18 NOTE — PROGRESS NOTES
Physical Therapy Daily Treatment Note    [facility]   [address] [facility] [address], [address] [place] [address]      Patient: Jessika Singletary   : 1960  Referring practitioner: Ciara Ashley MD  Date of Initial Visit: Type: THERAPY  Noted: 2022  Today's Date: 2022  Patient seen for 7 sessions    Certification Period 2022 thru 2/15/2023       Visit Diagnoses:    ICD-10-CM ICD-9-CM   1. Chronic right shoulder pain  M25.511 719.41    G89.29 338.29       Subjective     Pt states that she is feeling improvement since beginning therapy and she has had less pain in the shoulder while working and at rest.     Objective   See Exercise, Manual, and Modality Logs for complete treatment.       Assessment/Plan     Decreased hypertonicity and decreased guarding noted with manual therapy in the clinic today. Pt was able to perform exercise without increasing her pain. Will cont to progress as indicated.       Timed:         Manual Therapy:    16     mins  30083;     Therapeutic Exercise:    15     mins  32653;     Neuromuscular Oracio:    25    mins  36444;    Therapeutic Activity:          mins  31295;     Gait Training:           mins  27937;     Ultrasound:          mins  55484;    Ionto                                   mins   73850  Self Care                            mins   82431  Canalith Repos         mins 79545  Electrical Stimulation:         mins  95255    Un-Timed:  Electrical Stimulation:         mins  96797 ( );  Dry Needling          mins self-pay  Traction          mins 10958      Timed Treatment:   56   mins   Total Treatment:     56   mins    Brandon Geiger, PT, DPT, OCS, Cert. DN  [place] License: [id number]

## 2022-11-23 ENCOUNTER — TREATMENT (OUTPATIENT)
Dept: PHYSICAL THERAPY | Facility: CLINIC | Age: 62
End: 2022-11-23

## 2022-11-23 DIAGNOSIS — M25.511 CHRONIC RIGHT SHOULDER PAIN: Primary | ICD-10-CM

## 2022-11-23 DIAGNOSIS — G89.29 CHRONIC RIGHT SHOULDER PAIN: Primary | ICD-10-CM

## 2022-11-23 PROCEDURE — 97110 THERAPEUTIC EXERCISES: CPT | Performed by: PHYSICAL THERAPIST

## 2022-11-23 PROCEDURE — 97112 NEUROMUSCULAR REEDUCATION: CPT | Performed by: PHYSICAL THERAPIST

## 2022-11-23 PROCEDURE — 97140 MANUAL THERAPY 1/> REGIONS: CPT | Performed by: PHYSICAL THERAPIST

## 2022-11-29 NOTE — PROGRESS NOTES
Physical Therapy Daily Treatment Note    [facility]   [address] [facility] [address] [facility] [address], [address] [place] [address]      Patient: Jessika Singletary   : 1960  Referring practitioner: Ciara Ashley MD  Date of Initial Visit: Type: THERAPY  Noted: 2022  Today's Date: 2022  Patient seen for 8 sessions    Certification Period 2022 thru 2023       Visit Diagnoses:    ICD-10-CM ICD-9-CM   1. Chronic right shoulder pain  M25.511 719.41    G89.29 338.29       Subjective     Pt states that she feels like she is starting to make some improvement and she feels less pain in the shoulder while working, but her pain is still an issue.     Objective   See Exercise, Manual, and Modality Logs for complete treatment.       Assessment/Plan     Pt is making steady progress and she demonstrates an improvement in her overall toelrance to activity. Will cont to progress as indicated.       Timed:         Manual Therapy:    13     mins  12871;     Therapeutic Exercise:    13     mins  99814;     Neuromuscular Oracio:    30    mins  08970;    Therapeutic Activity:          mins  89737;     Gait Training:           mins  80290;     Ultrasound:          mins  51599;    Ionto                                   mins   06674  Self Care                            mins   77358  Canalith Repos         mins 44842  Electrical Stimulation:         mins  79125    Un-Timed:  Electrical Stimulation:         mins  02779 ( );  Dry Needling          mins self-pay  Traction          mins 54769      Timed Treatment:   56   mins   Total Treatment:     56   mins    Brandon Geiger, PT, DPT, OCS, Cert. DN  [place] License: [id number]

## 2022-12-02 ENCOUNTER — HOSPITAL ENCOUNTER (OUTPATIENT)
Dept: MAMMOGRAPHY | Facility: HOSPITAL | Age: 62
Discharge: HOME | End: 2022-12-02

## 2022-12-02 DIAGNOSIS — Z12.31 ENCOUNTER FOR SCREENING MAMMOGRAM FOR BREAST CANCER: ICD-10-CM

## 2022-12-09 ENCOUNTER — TREATMENT (OUTPATIENT)
Dept: PHYSICAL THERAPY | Facility: CLINIC | Age: 62
End: 2022-12-09

## 2022-12-09 DIAGNOSIS — G89.29 CHRONIC RIGHT SHOULDER PAIN: Primary | ICD-10-CM

## 2022-12-09 DIAGNOSIS — M25.511 CHRONIC RIGHT SHOULDER PAIN: Primary | ICD-10-CM

## 2022-12-09 PROCEDURE — 97110 THERAPEUTIC EXERCISES: CPT | Performed by: PHYSICAL THERAPIST

## 2022-12-09 PROCEDURE — 97140 MANUAL THERAPY 1/> REGIONS: CPT | Performed by: PHYSICAL THERAPIST

## 2022-12-09 PROCEDURE — 97112 NEUROMUSCULAR REEDUCATION: CPT | Performed by: PHYSICAL THERAPIST

## 2022-12-14 NOTE — PROGRESS NOTES
Physical Therapy Daily Treatment Note    [facility]   [address] [facility], [address] [place] [address]      Patient: Jessika Singletary   : 1960  Referring practitioner: Ciara Ashley MD  Date of Initial Visit: Type: THERAPY  Noted: 2022  Today's Date: 2022  Patient seen for 9 sessions    Certification Period 2022 thru 3/13/2023       Visit Diagnoses:    ICD-10-CM ICD-9-CM   1. Chronic right shoulder pain  M25.511 719.41    G89.29 338.29       Subjective     Pt states that she has been feeling mostly better recently, but today seems to be a little worse and she has more soreness in the arm.     Objective   See Exercise, Manual, and Modality Logs for complete treatment.       Assessment/Plan     Pt is making steady progress and is able to tolerate more exercise in the clinic without exacerbartion of symprtoms. Will cont to progress as indicated.       Timed:         Manual Therapy:    12     mins  80915;     Therapeutic Exercise:    15     mins  10143;     Neuromuscular Oracio:    28    mins  71575;    Therapeutic Activity:          mins  95278;     Gait Training:           mins  74618;     Ultrasound:          mins  17884;    Ionto                                   mins   17884  Self Care                            mins   83642  Canalith Repos         mins 47383  Electrical Stimulation:         mins  98539    Un-Timed:  Electrical Stimulation:         mins  50940 ( );  Dry Needling          mins self-pay  Traction          mins 32984      Timed Treatment:   55   mins   Total Treatment:     55   mins    Brandon Geiger, PT, DPT, OCS, Cert. DN  [place] License: [id number]

## 2022-12-15 PROCEDURE — U0004 COV-19 TEST NON-CDC HGH THRU: HCPCS | Performed by: FAMILY MEDICINE

## 2023-01-06 ENCOUNTER — TREATMENT (OUTPATIENT)
Dept: PHYSICAL THERAPY | Facility: CLINIC | Age: 63
End: 2023-01-06
Payer: COMMERCIAL

## 2023-01-06 DIAGNOSIS — M25.511 CHRONIC RIGHT SHOULDER PAIN: Primary | ICD-10-CM

## 2023-01-06 DIAGNOSIS — G89.29 CHRONIC RIGHT SHOULDER PAIN: Primary | ICD-10-CM

## 2023-01-06 PROCEDURE — 97110 THERAPEUTIC EXERCISES: CPT | Performed by: PHYSICAL THERAPIST

## 2023-01-06 PROCEDURE — 97140 MANUAL THERAPY 1/> REGIONS: CPT | Performed by: PHYSICAL THERAPIST

## 2023-01-06 PROCEDURE — 97112 NEUROMUSCULAR REEDUCATION: CPT | Performed by: PHYSICAL THERAPIST

## 2023-01-06 NOTE — PROGRESS NOTES
Physical Therapy Daily Treatment Note    [facility]   [address] [facility] [address], [address] [place] [address]      Patient: Jessika Singletary   : 1960  Referring practitioner: Ciara Ashley MD  Date of Initial Visit: Type: THERAPY  Noted: 2022  Today's Date: 2023  Patient seen for 10 sessions    Certification Period 2023 thru 2023       Visit Diagnoses:    ICD-10-CM ICD-9-CM   1. Chronic right shoulder pain  M25.511 719.41    G89.29 338.29       Subjective     Pt states that she is feeling a little sore in the upper arm but she still feels that she has made some progress overall. She reports doing the exercises daily.     Objective   See Exercise, Manual, and Modality Logs for complete treatment.       Assessment/Plan     Hypertonicity and tenderness noted at the right upper trap and deltoid. No specific tenderness noted at the anterior GH joint. Will cont to progess as tolerated.       Timed:         Manual Therapy:    16     mins  39461;     Therapeutic Exercise:    15     mins  67927;     Neuromuscular Oracio:    15    mins  13225;    Therapeutic Activity:          mins  65722;     Gait Training:           mins  15767;     Ultrasound:          mins  66548;    Ionto                                   mins   73540  Self Care                            mins   48160  Canalith Repos         mins 20496  Electrical Stimulation:         mins  14021    Un-Timed:  Electrical Stimulation:         mins  78444 ( );  Dry Needling          mins self-pay  Traction          mins 05226      Timed Treatment:   46   mins   Total Treatment:     46   mins    Brandon Geiger, PT, DPT, OCS, Cert. DN  [place] License: [id number]

## 2023-01-13 ENCOUNTER — TREATMENT (OUTPATIENT)
Dept: PHYSICAL THERAPY | Facility: CLINIC | Age: 63
End: 2023-01-13
Payer: COMMERCIAL

## 2023-01-13 ENCOUNTER — OFFICE VISIT (OUTPATIENT)
Dept: OBSTETRICS AND GYNECOLOGY | Facility: CLINIC | Age: 63
End: 2023-01-13
Payer: COMMERCIAL

## 2023-01-13 VITALS — WEIGHT: 210 LBS | BODY MASS INDEX: 34.95 KG/M2

## 2023-01-13 DIAGNOSIS — R10.30 INGUINAL PAIN, UNSPECIFIED LATERALITY: ICD-10-CM

## 2023-01-13 DIAGNOSIS — G89.29 CHRONIC RIGHT SHOULDER PAIN: Primary | ICD-10-CM

## 2023-01-13 DIAGNOSIS — M25.511 CHRONIC RIGHT SHOULDER PAIN: Primary | ICD-10-CM

## 2023-01-13 DIAGNOSIS — R10.31 GROIN PAIN, RIGHT: Primary | ICD-10-CM

## 2023-01-13 PROCEDURE — 97112 NEUROMUSCULAR REEDUCATION: CPT | Performed by: PHYSICAL THERAPIST

## 2023-01-13 PROCEDURE — 99212 OFFICE O/P EST SF 10 MIN: CPT | Performed by: OBSTETRICS & GYNECOLOGY

## 2023-01-13 PROCEDURE — 97110 THERAPEUTIC EXERCISES: CPT | Performed by: PHYSICAL THERAPIST

## 2023-01-13 PROCEDURE — 97530 THERAPEUTIC ACTIVITIES: CPT | Performed by: PHYSICAL THERAPIST

## 2023-01-13 NOTE — PROGRESS NOTES
Follow-up        HPI  Jessika Singletary is a 62 y.o. female, , who presents for initial evaluation evaluation of pelvic pain.      The pain is located in the lower pelvic region and it does not radiate. She has experienced this problem for 3 months. She describes the pain as pressure and it occurs when she does a lot of walking or climbing stairs. She rates her pain score as a 10/10 when at its worst. Patient notes aggravating factors include exertion and alleviating factors are sitz baths.  The patient reports additional symptoms as none.  The patient has not previously been evaluated for pelvic pain. The patient is not currently sexually active.    Did the patient have u/s today? No.    Her last LMP was No LMP recorded (lmp unknown). Patient is postmenopausal..      Problems with GI: no  History of urinary disease: no  Concern for Anxiety or Depression: no  Exercises Regularly: yes  Tobacco Usage?: No     Additional OB/GYN History   Last Pap :   Last Completed Pap Smear          PAP SMEAR (Every 3 Years) Next due on 2022  LIQUID-BASED PAP SMEAR, [facility] ([facility])    06/15/2016  SCANNED - PAP SMEAR    2015  [facility] Pap smear component of [facility] PAP SMEAR                OB History        1    Para   1    Term   1            AB        Living   1       SAB        IAB        Ectopic        Molar        Multiple        Live Births   1                No current outpatient medications on file.     Past Medical History:   Diagnosis Date   • Allergic rhinitis    • Prediabetes    • Small bowel obstruction (HCC) 2021    DIAGNOSTIC LAPAROSCOPY, LYSIS OF ADHESIONS   • Vitamin D deficiency         Past Surgical History:   Procedure Laterality Date   • COLONOSCOPY     • DIAGNOSTIC LAPAROSCOPY N/A 2021    Procedure: DIAGNOSTIC LAPAROSCOPY, LYSIS OF ADHESIONS;  Surgeon: Estevan Narayanan MD;  Location: [facility];  Service: General;  Laterality: N/A;   • HYSTERECTOMY   1991    abdominal hysterectomy with (L) oophorectomy; fibroids   • OOPHORECTOMY      unilateral       The additional following portions of the patient's history were reviewed and updated as appropriate: allergies and current medications.      Review of Systems  All other systems reviewed and are negative.     I have reviewed and agree with the HPI, ROS, and historical information as entered above. Yesenia Villegas MD    Objective   Wt 95.3 kg (210 lb)   LMP  (LMP Unknown)   BMI 34.95 kg/m²     Physical Exam  Vitals and nursing note reviewed. Exam conducted with a chaperone present.   Constitutional:       Appearance: She is well-developed.   HENT:      Head: Normocephalic and atraumatic.   Neck:      Thyroid: No thyroid mass or thyromegaly.   Cardiovascular:      Rate and Rhythm: Normal rate and regular rhythm.      Heart sounds: No murmur heard.  Pulmonary:      Effort: Pulmonary effort is normal. No retractions.      Breath sounds: Normal breath sounds. No wheezing, rhonchi or rales.   Chest:      Chest wall: No mass or tenderness.   Breasts:     Right: Normal. No mass, nipple discharge, skin change or tenderness.      Left: Normal. No mass, nipple discharge, skin change or tenderness.   Abdominal:      General: Bowel sounds are normal.      Palpations: Abdomen is soft. Abdomen is not rigid. There is no mass.      Tenderness: There is no abdominal tenderness. There is no guarding.      Hernia: No hernia is present. There is no hernia in the left inguinal area or right inguinal area.   Genitourinary:     General: Normal vulva.      Exam position: Lithotomy position.      Pubic Area: No rash.       Labia:         Right: No rash, tenderness or lesion.         Left: No rash, tenderness or lesion.       Urethra: No urethral pain or urethral swelling.      Vagina: Normal. No vaginal discharge or lesions.      Uterus: Absent.       Adnexa:         Right: No mass, tenderness or fullness.          Left: No mass, tenderness  or fullness.        Rectum: No external hemorrhoid.      Comments: Cervix surgically absent.  Vaginal cuff intact.  Musculoskeletal:      Cervical back: Normal range of motion. No muscular tenderness.   Neurological:      Mental Status: She is alert and oriented to person, place, and time.   Psychiatric:         Behavior: Behavior normal.     Pain in right groin    Assessment & Plan     Assessment and Plan    Problem List Items Addressed This Visit    None  Visit Diagnoses     Groin pain, right    -  Primary    Inguinal pain, unspecified laterality          Exam is normal except for pain in her right groin    1. Follow Up PRN  2. Patient has pain in right groin hurts after she does the stairmaster.  Ultrasound here shows normal pelvis no masses as she only has 1 ovary I see no pathology.  Pelvic exam shows no pain except for right groin no think she has inguinal hernias.  I think it is a arthritis in her right hip told her to see her family doctor for further work-up.  Return if symptoms worsen or fail to improve.    Yesenia Villegas MD  01/13/2023

## 2023-01-17 NOTE — PROGRESS NOTES
Physical Therapy Daily Treatment Note    [facility]   [address] [facility], [address] [place] [address]      Patient: Jessika Singletary   : 1960  Referring practitioner: Ciara Ashley MD  Date of Initial Visit: Type: THERAPY  Noted: 2022  Today's Date: 2023  Patient seen for 11 sessions    Certification Period 2023 thru 4/15/2023       Visit Diagnoses:    ICD-10-CM ICD-9-CM   1. Chronic right shoulder pain  M25.511 719.41    G89.29 338.29       Subjective     Pt states that she is feeling improvement and she has noticed that she is able to do more with the arm while at work without an increase in pain.     Objective   See Exercise, Manual, and Modality Logs for complete treatment.       Assessment/Plan     Pt is makign steady progress and she demonstrates an improvement in strength and tolerance to functional activity. Will cont to progress as indicated.       Timed:         Manual Therapy:         mins  87258;     Therapeutic Exercise:    12     mins  57368;     Neuromuscular Oracio:    16    mins  33544;    Therapeutic Activity:     28     mins  49075;     Gait Training:           mins  20414;     Ultrasound:          mins  93809;    Ionto                                   mins   04416  Self Care                            mins   82255  Canalith Repos         mins 25758  Electrical Stimulation:         mins  34363    Un-Timed:  Electrical Stimulation:         mins  29804 ( );  Dry Needling          mins self-pay  Traction          mins 27374      Timed Treatment:   56   mins   Total Treatment:     56   mins    Brandon Geiger, PT, DPT, OCS, Cert. DN  [place] License: [id number]

## 2023-01-30 ENCOUNTER — OFFICE VISIT (OUTPATIENT)
Dept: PULMONOLOGY | Facility: CLINIC | Age: 63
End: 2023-01-30
Payer: COMMERCIAL

## 2023-01-30 VITALS
TEMPERATURE: 97.5 F | HEART RATE: 76 BPM | OXYGEN SATURATION: 98 % | WEIGHT: 207.2 LBS | SYSTOLIC BLOOD PRESSURE: 124 MMHG | BODY MASS INDEX: 34.52 KG/M2 | DIASTOLIC BLOOD PRESSURE: 78 MMHG | HEIGHT: 65 IN

## 2023-01-30 DIAGNOSIS — J45.40 MODERATE PERSISTENT ASTHMA, UNSPECIFIED WHETHER COMPLICATED: Primary | ICD-10-CM

## 2023-01-30 PROCEDURE — 99214 OFFICE O/P EST MOD 30 MIN: CPT | Performed by: INTERNAL MEDICINE

## 2023-01-30 RX ORDER — BUDESONIDE AND FORMOTEROL FUMARATE DIHYDRATE 160; 4.5 UG/1; UG/1
2 AEROSOL RESPIRATORY (INHALATION)
Qty: 1 EACH | Refills: 12 | Status: SHIPPED | OUTPATIENT
Start: 2023-01-30

## 2023-01-30 NOTE — PROGRESS NOTES
"CC:    wheezing    HPI:    60 y/o AAF w/ h/o recent SBO requiring surgery in July, lifetime non-smoker, who has had episodic wheezing waking her up at night since the Spring.  Initially this was quite alarming but has improved to some degree.  She does exercise, and does not notice any wheezing, chest tightness, shortness of breath,etc.  No typical sounding asthmatic triggers.  No GERD symptoms.  When she wakes up she is not short of breath but can hear an audible \"wheezing\" sound coming from upper airway.  Hasn't had overt allergies.  Has had some dry cough and feels like she can't get it up.  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    Strong family h/o asthma.    Prior visits tried flonase and allegra which didn't help.  She then tried prilosec but this upset her stomach.    INTERVAL HISTORY:    Patient returns today for follow-up.  Last visit was tried on Breo.  This stopped the wheezing, but caused some stomach upset.  No prominent allergy / sinus symptoms.    PMH:    Past Medical History:   Diagnosis Date   • Allergic rhinitis    • Prediabetes    • Small bowel obstruction (HCC) 07/08/2021    DIAGNOSTIC LAPAROSCOPY, LYSIS OF ADHESIONS   • Vitamin D deficiency      PSH:    Past Surgical History:   Procedure Laterality Date   • COLONOSCOPY     • DIAGNOSTIC LAPAROSCOPY N/A 07/08/2021    Procedure: DIAGNOSTIC LAPAROSCOPY, LYSIS OF ADHESIONS;  Surgeon: Estevan Narayanan MD;  Location: [facility];  Service: General;  Laterality: N/A;   • HYSTERECTOMY  1991    abdominal hysterectomy with (L) oophorectomy; fibroids   • OOPHORECTOMY      unilateral     FH:    Family History   Problem Relation Age of Onset   • Arthritis Father    • Hypertension Mother    • Stroke Mother    • Breast cancer Paternal Aunt    • Diabetes Grandchild    • Ovarian cancer Neg Hx    • Colon cancer Neg Hx    • Heart attack Neg Hx    • Uterine cancer Neg Hx      SH:    Social History     Socioeconomic History   • Marital " "status: Single   Tobacco Use   • Smoking status: Never   • Smokeless tobacco: Never   Vaping Use   • Vaping Use: Never used   Substance and Sexual Activity   • Alcohol use: No   • Drug use: Never   • Sexual activity: Not Currently     Partners: Male     Birth control/protection: None     ALLERGIES:    No Known Allergies  MEDICATIONS:      Current Outpatient Medications:   •  budesonide-formoterol (Symbicort) 160-4.5 MCG/ACT inhaler, Inhale 2 puffs 2 (Two) Times a Day., Disp: 1 each, Rfl: 12  ROS:  Per HPI, otherwise all systems reviewed and negative.    DIAGNOSTIC DATA (Reviewed and interpreted by me unless otherwise specified):    CT Chest 10/25/21 - scattered granulomas otherwise normal appearing lung parenchyma    PFT 11/24/21 - mild restriction, no obstruction, mild reduction in DLCO overcorrects, low ERV.  Overall \"normal\" PFT when accounting for BMI / body habitus.    Vitals:    01/30/23 1432   BP: 124/78   Pulse: 76   Temp: 97.5 °F (36.4 °C)   SpO2: 98%       Physical Exam:    Constitutional: Alert, no Distress  Mouth/Throat: Oropharynx is clear and moist.   Cardiovascular: Normal rate, regular rhythm, normal heart sounds.   Pulmonary/Chest: Effort normal and breath sounds normal.  No clubbing.       Assessment/Plan     1)  Nocturnal Wheezing  2)  Chronic Cough  3)  Asthma    Patient did not improve with treatment of allergic rhinitis/postnasal drip, and GERD.  She could have cough variant asthma or mild persistent asthma.  Improved with Breo but this medicine upset her stomach.  No known milk allergy / intolerance.    Try switching to symbicort 160 bid, would like to check back in 2-3 months to see if her PFT's normalize with this.    RTC 2-3 months w/ Full PFT w/ BD    Sachin Cordova MD  Pulmonology and Critical Care Medicine  01/30/23 16:46 EST  Electronically Signed    C.C.:  No ref. provider found, Ciara Ashley MD        "

## 2023-02-03 ENCOUNTER — TREATMENT (OUTPATIENT)
Dept: PHYSICAL THERAPY | Facility: CLINIC | Age: 63
End: 2023-02-03
Payer: COMMERCIAL

## 2023-02-03 DIAGNOSIS — M25.511 CHRONIC RIGHT SHOULDER PAIN: Primary | ICD-10-CM

## 2023-02-03 DIAGNOSIS — G89.29 CHRONIC RIGHT SHOULDER PAIN: Primary | ICD-10-CM

## 2023-02-03 PROCEDURE — 97110 THERAPEUTIC EXERCISES: CPT | Performed by: PHYSICAL THERAPIST

## 2023-02-03 PROCEDURE — 97140 MANUAL THERAPY 1/> REGIONS: CPT | Performed by: PHYSICAL THERAPIST

## 2023-02-03 PROCEDURE — 97112 NEUROMUSCULAR REEDUCATION: CPT | Performed by: PHYSICAL THERAPIST

## 2023-02-07 NOTE — PROGRESS NOTES
Physical Therapy Daily Treatment Note    [facility]   [address], [address] [place] [address]      Patient: Jessika Singletary   : 1960  Referring practitioner: Ciara Ashley MD  Date of Initial Visit: Type: THERAPY  Noted: 2022  Today's Date: 2023  Patient seen for 12 sessions    Certification Period 2023 thru 2023       Visit Diagnoses:    ICD-10-CM ICD-9-CM   1. Chronic right shoulder pain  M25.511 719.41    G89.29 338.29       Subjective     Pt states that she feels like she is doing better each visit and she is able to do more with her right arm without an increase in pain or feeling unstable.     Objective   See Exercise, Manual, and Modality Logs for complete treatment.       Assessment/Plan     Pt continues to progress well and she demonstrates an improvement in her tolerance to activity in the clinic. Will cont to progress as indicated.       Timed:         Manual Therapy:    23     mins  91032;     Therapeutic Exercise:    15     mins  93864;     Neuromuscular Oracio:    15    mins  33147;    Therapeutic Activity:          mins  90161;     Gait Training:           mins  91277;     Ultrasound:          mins  53827;    Ionto                                   mins   41632  Self Care                            mins   38216  Canalith Repos         mins 38347  Electrical Stimulation:         mins  01705    Un-Timed:  Electrical Stimulation:         mins  70468 ( );  Dry Needling          mins self-pay  Traction          mins 16398      Timed Treatment:   53   mins   Total Treatment:     53   mins    Brandon Geiger, PT, DPT, OCS, Cert. DN  [place] License: [id number]

## 2023-02-10 ENCOUNTER — TREATMENT (OUTPATIENT)
Dept: PHYSICAL THERAPY | Facility: CLINIC | Age: 63
End: 2023-02-10
Payer: COMMERCIAL

## 2023-02-10 DIAGNOSIS — M25.511 CHRONIC RIGHT SHOULDER PAIN: Primary | ICD-10-CM

## 2023-02-10 DIAGNOSIS — G89.29 CHRONIC RIGHT SHOULDER PAIN: Primary | ICD-10-CM

## 2023-02-10 PROCEDURE — 97112 NEUROMUSCULAR REEDUCATION: CPT | Performed by: PHYSICAL THERAPIST

## 2023-02-10 PROCEDURE — 97530 THERAPEUTIC ACTIVITIES: CPT | Performed by: PHYSICAL THERAPIST

## 2023-02-10 PROCEDURE — 97140 MANUAL THERAPY 1/> REGIONS: CPT | Performed by: PHYSICAL THERAPIST

## 2023-02-15 ENCOUNTER — HOSPITAL ENCOUNTER (OUTPATIENT)
Dept: MAMMOGRAPHY | Facility: HOSPITAL | Age: 63
Discharge: HOME | End: 2023-02-15
Admitting: FAMILY MEDICINE
Payer: COMMERCIAL

## 2023-02-15 PROCEDURE — 77067 SCR MAMMO BI INCL CAD: CPT | Performed by: RADIOLOGY

## 2023-02-15 PROCEDURE — 77063 BREAST TOMOSYNTHESIS BI: CPT | Performed by: RADIOLOGY

## 2023-02-15 PROCEDURE — 77063 BREAST TOMOSYNTHESIS BI: CPT

## 2023-02-15 PROCEDURE — 77067 SCR MAMMO BI INCL CAD: CPT

## 2023-02-15 NOTE — PROGRESS NOTES
Physical Therapy Daily Treatment Note    [facility]   [address], [address] [place] [address]      Patient: Jessika Singletary   : 1960  Referring practitioner: Ciara Ashley MD  Date of Initial Visit: Type: THERAPY  Noted: 2022  Today's Date: 2/15/2023  Patient seen for 13 sessions    Certification Period 2/15/2023 thru 5/15/2023       Visit Diagnoses:    ICD-10-CM ICD-9-CM   1. Chronic right shoulder pain  M25.511 719.41    G89.29 338.29       Subjective     Pt states that she feels like she is continuing to make progress and she feels improvement in her tolerance to prolonged use of the right arm at work and with exercise.     Objective   See Exercise, Manual, and Modality Logs for complete treatment.       Assessment/Plan     Pt is progressing well and she demonstrates an improvement in her pain free shoulder ROM in all planes. Will cont to progress as indicated.       Timed:         Manual Therapy:    13     mins  69190;     Therapeutic Exercise:         mins  27386;     Neuromuscular Oracio:    13    mins  35651;    Therapeutic Activity:     30     mins  22578;     Gait Training:           mins  51453;     Ultrasound:          mins  26490;    Ionto                                   mins   27196  Self Care                            mins   20581  Canalith Repos         mins 96850  Electrical Stimulation:         mins  54802    Un-Timed:  Electrical Stimulation:         mins  39279 ( );  Dry Needling          mins self-pay  Traction          mins 04372      Timed Treatment:   56   mins   Total Treatment:     56   mins    Brandon Geiger, PT, DPT, OCS, Cert. DN  [place] License: [id number]

## 2023-02-24 ENCOUNTER — TREATMENT (OUTPATIENT)
Dept: PHYSICAL THERAPY | Facility: CLINIC | Age: 63
End: 2023-02-24
Payer: COMMERCIAL

## 2023-02-24 DIAGNOSIS — M25.511 CHRONIC RIGHT SHOULDER PAIN: Primary | ICD-10-CM

## 2023-02-24 DIAGNOSIS — G89.29 CHRONIC RIGHT SHOULDER PAIN: Primary | ICD-10-CM

## 2023-02-24 PROCEDURE — 97112 NEUROMUSCULAR REEDUCATION: CPT | Performed by: PHYSICAL THERAPIST

## 2023-02-24 PROCEDURE — 97530 THERAPEUTIC ACTIVITIES: CPT | Performed by: PHYSICAL THERAPIST

## 2023-02-24 PROCEDURE — 97110 THERAPEUTIC EXERCISES: CPT | Performed by: PHYSICAL THERAPIST

## 2023-02-24 PROCEDURE — 97140 MANUAL THERAPY 1/> REGIONS: CPT | Performed by: PHYSICAL THERAPIST

## 2023-02-28 NOTE — PROGRESS NOTES
Physical Therapy Daily Treatment Note    [facility]   [address], [address] [place] [address]      Patient: Jessika Singletary   : 1960  Referring practitioner: Ciara Ashley MD  Date of Initial Visit: Type: THERAPY  Noted: 2022  Today's Date: 2023  Patient seen for 14 sessions    Certification Period 2023 thru 2023       Visit Diagnoses:    ICD-10-CM ICD-9-CM   1. Chronic right shoulder pain  M25.511 719.41    G89.29 338.29       Subjective     Pt states that she is feeling improvement in her shoulder and she continues to notice being able to do more with the right arm while at work and at home.     Objective   See Exercise, Manual, and Modality Logs for complete treatment.       Assessment/Plan     Pt is progressing well and she demonstrtaes an improvement in her shoudler AROM in all planes and in her ability to perform overhead lifting. Will cont to progress as indicated.       Timed:         Manual Therapy:    13     mins  17376;     Therapeutic Exercise:    10     mins  94309;     Neuromuscular Oracio:    15    mins  57184;    Therapeutic Activity:     15     mins  61504;     Gait Training:           mins  16728;     Ultrasound:          mins  21272;    Ionto                                   mins   77753  Self Care                            mins   86607  Canalith Repos         mins 73559  Electrical Stimulation:         mins  21550    Un-Timed:  Electrical Stimulation:         mins  36678 ( );  Dry Needling          mins self-pay  Traction          mins 34760      Timed Treatment:   53   mins   Total Treatment:     53   mins    Brandon Geiger, PT, DPT, OCS, Cert. DN  [place] License: [id number]

## 2023-03-03 ENCOUNTER — TREATMENT (OUTPATIENT)
Dept: PHYSICAL THERAPY | Facility: CLINIC | Age: 63
End: 2023-03-03
Payer: COMMERCIAL

## 2023-03-03 DIAGNOSIS — M25.511 CHRONIC RIGHT SHOULDER PAIN: Primary | ICD-10-CM

## 2023-03-03 DIAGNOSIS — G89.29 CHRONIC RIGHT SHOULDER PAIN: Primary | ICD-10-CM

## 2023-03-03 PROCEDURE — 97140 MANUAL THERAPY 1/> REGIONS: CPT | Performed by: PHYSICAL THERAPIST

## 2023-03-03 PROCEDURE — 97530 THERAPEUTIC ACTIVITIES: CPT | Performed by: PHYSICAL THERAPIST

## 2023-03-03 PROCEDURE — 97112 NEUROMUSCULAR REEDUCATION: CPT | Performed by: PHYSICAL THERAPIST

## 2023-03-03 PROCEDURE — 97110 THERAPEUTIC EXERCISES: CPT | Performed by: PHYSICAL THERAPIST

## 2023-03-03 NOTE — PROGRESS NOTES
Physical Therapy Daily Treatment Note    [facility]   [address], [address] [place] [address]      Patient: Jessika Singletary   : 1960  Referring practitioner: Ciara Ashley MD  Date of Initial Visit: Type: THERAPY  Noted: 2022  Today's Date: 3/3/2023  Patient seen for 15 sessions    Certification Period 3/3/2023 thru 2023       Visit Diagnoses:    ICD-10-CM ICD-9-CM   1. Chronic right shoulder pain  M25.511 719.41    G89.29 338.29       Subjective     Pt states that she is feeling improvement each week and she has significantly less pain when using the arm at work and at rest.     Objective   See Exercise, Manual, and Modality Logs for complete treatment.       Assessment/Plan     Pt is progressig well and she is able to perform resisted ER without an increase in soreness/pain in the shoulder. Will cont to progress as indicated.       Timed:         Manual Therapy:    14     mins  36967;     Therapeutic Exercise:    10     mins  44551;     Neuromuscular Oracio:    15    mins  97916;    Therapeutic Activity:     15     mins  92567;     Gait Training:           mins  42841;     Ultrasound:          mins  60428;    Ionto                                   mins   62432  Self Care                            mins   34399  Canalith Repos         mins 98393  Electrical Stimulation:         mins  93367    Un-Timed:  Electrical Stimulation:         mins  07098 ( );  Dry Needling          mins self-pay  Traction          mins 49248      Timed Treatment:   54   mins   Total Treatment:     54   mins    Brandon Geiger, PT, DPT, OCS, Cert. DN  [place] License: [id number]

## 2023-03-10 ENCOUNTER — TREATMENT (OUTPATIENT)
Dept: PHYSICAL THERAPY | Facility: CLINIC | Age: 63
End: 2023-03-10
Payer: COMMERCIAL

## 2023-03-10 DIAGNOSIS — M25.511 CHRONIC RIGHT SHOULDER PAIN: Primary | ICD-10-CM

## 2023-03-10 DIAGNOSIS — G89.29 CHRONIC RIGHT SHOULDER PAIN: Primary | ICD-10-CM

## 2023-03-10 PROCEDURE — 97140 MANUAL THERAPY 1/> REGIONS: CPT | Performed by: PHYSICAL THERAPIST

## 2023-03-10 PROCEDURE — 97110 THERAPEUTIC EXERCISES: CPT | Performed by: PHYSICAL THERAPIST

## 2023-03-10 PROCEDURE — 97112 NEUROMUSCULAR REEDUCATION: CPT | Performed by: PHYSICAL THERAPIST

## 2023-03-10 PROCEDURE — 97530 THERAPEUTIC ACTIVITIES: CPT | Performed by: PHYSICAL THERAPIST

## 2023-03-13 NOTE — PROGRESS NOTES
Physical Therapy Daily Treatment Note    [facility]   [address], [address] [place] [address]      Patient: Jessika Singletary   : 1960  Referring practitioner: Ciara Ashley MD  Date of Initial Visit: Type: THERAPY  Noted: 2022  Today's Date: 3/13/2023  Patient seen for 16 sessions    Certification Period 3/13/2023 thru 6/10/2023       Visit Diagnoses:    ICD-10-CM ICD-9-CM   1. Chronic right shoulder pain  M25.511 719.41    G89.29 338.29       Subjective     Pt states that she is feeling improvement in her shoulder strength and she has significantly less pain in the shoulder with all activity.     Objective   See Exercise, Manual, and Modality Logs for complete treatment.       Assessment/Plan     Pt is progressing well and she demonstrates an improvement in her ability to perform lifting exercise and functional activity in the clinic. Will cont to progress as indicated.       Timed:         Manual Therapy:    16     mins  75037;     Therapeutic Exercise:    14     mins  73856;     Neuromuscular Oracio:    12    mins  57769;    Therapeutic Activity:     14     mins  73926;     Gait Training:           mins  76097;     Ultrasound:          mins  04415;    Ionto                                   mins   07247  Self Care                            mins   94802  Canalith Repos         mins 02390  Electrical Stimulation:         mins  67055    Un-Timed:  Electrical Stimulation:         mins  20581 ( );  Dry Needling          mins self-pay  Traction          mins 96642      Timed Treatment:   56   mins   Total Treatment:     56   mins    Brandon Geiger, PT, DPT, OCS, Cert. DN  [place] License: [id number]

## 2023-03-24 ENCOUNTER — TREATMENT (OUTPATIENT)
Dept: PHYSICAL THERAPY | Facility: CLINIC | Age: 63
End: 2023-03-24
Payer: COMMERCIAL

## 2023-03-24 DIAGNOSIS — G89.29 CHRONIC RIGHT SHOULDER PAIN: Primary | ICD-10-CM

## 2023-03-24 DIAGNOSIS — M25.511 CHRONIC RIGHT SHOULDER PAIN: Primary | ICD-10-CM

## 2023-03-24 PROCEDURE — 97110 THERAPEUTIC EXERCISES: CPT | Performed by: PHYSICAL THERAPIST

## 2023-03-24 PROCEDURE — 97140 MANUAL THERAPY 1/> REGIONS: CPT | Performed by: PHYSICAL THERAPIST

## 2023-03-24 PROCEDURE — 97112 NEUROMUSCULAR REEDUCATION: CPT | Performed by: PHYSICAL THERAPIST

## 2023-03-31 ENCOUNTER — TREATMENT (OUTPATIENT)
Dept: PHYSICAL THERAPY | Facility: CLINIC | Age: 63
End: 2023-03-31
Payer: COMMERCIAL

## 2023-03-31 DIAGNOSIS — G89.29 CHRONIC RIGHT SHOULDER PAIN: Primary | ICD-10-CM

## 2023-03-31 DIAGNOSIS — M25.511 CHRONIC RIGHT SHOULDER PAIN: Primary | ICD-10-CM

## 2023-03-31 PROCEDURE — 97140 MANUAL THERAPY 1/> REGIONS: CPT | Performed by: PHYSICAL THERAPIST

## 2023-03-31 PROCEDURE — 97110 THERAPEUTIC EXERCISES: CPT | Performed by: PHYSICAL THERAPIST

## 2023-03-31 PROCEDURE — 97112 NEUROMUSCULAR REEDUCATION: CPT | Performed by: PHYSICAL THERAPIST

## 2023-03-31 NOTE — PROGRESS NOTES
Physical Therapy Daily Treatment Note    [facility]   [address], [address] [place] [address]      Patient: Jessika Singletary   : 1960  Referring practitioner: Ciara Ashley MD  Date of Initial Visit: Type: THERAPY  Noted: 2022  Today's Date: 3/31/2023  Patient seen for 18 sessions    Certification Period 3/31/2023 thru 2023       Visit Diagnoses:    ICD-10-CM ICD-9-CM   1. Chronic right shoulder pain  M25.511 719.41    G89.29 338.29       Subjective     Pt states that she is feeling some soreness in the arm around the shoulder but she denies having any specific pain in the shoulder joint. She reports that the shoulder gets a little more irritated at times when using it while working.     Objective   See Exercise, Manual, and Modality Logs for complete treatment.       Assessment/Plan     Pt responded wellt o treatment today and she reported a decrease in symptom intensity with manual therapy and light exercise at the beginning of her session. She was able to complete exercises without exacerbation of symptoms.       Timed:         Manual Therapy:    14     mins  00379;     Therapeutic Exercise:    10     mins  59238;     Neuromuscular Oracio:    30    mins  43156;    Therapeutic Activity:          mins  41761;     Gait Training:           mins  37776;     Ultrasound:          mins  76899;    Ionto                                   mins   64515  Self Care                            mins   53083  Canalith Repos         mins 71972  Electrical Stimulation:         mins  65692    Un-Timed:  Electrical Stimulation:         mins  83155 (MC );  Dry Needling          mins self-pay  Traction          mins 48517      Timed Treatment:   54   mins   Total Treatment:     54   mins    Brandon Geiger, PT, DPT, OCS, Cert. DN  [place] License: [id number]

## 2023-04-14 ENCOUNTER — TREATMENT (OUTPATIENT)
Dept: PHYSICAL THERAPY | Facility: CLINIC | Age: 63
End: 2023-04-14
Payer: COMMERCIAL

## 2023-04-14 DIAGNOSIS — M25.511 CHRONIC RIGHT SHOULDER PAIN: Primary | ICD-10-CM

## 2023-04-14 DIAGNOSIS — G89.29 CHRONIC RIGHT SHOULDER PAIN: Primary | ICD-10-CM

## 2023-04-14 PROCEDURE — 97140 MANUAL THERAPY 1/> REGIONS: CPT | Performed by: PHYSICAL THERAPIST

## 2023-04-14 PROCEDURE — 97112 NEUROMUSCULAR REEDUCATION: CPT | Performed by: PHYSICAL THERAPIST

## 2023-04-14 PROCEDURE — 97110 THERAPEUTIC EXERCISES: CPT | Performed by: PHYSICAL THERAPIST

## 2023-04-14 NOTE — PROGRESS NOTES
Physical Therapy Daily Treatment Note    [facility]   [address], [address] [place] [address]      Patient: Jessika Singletary   : 1960  Referring practitioner: Ciara Ashley MD  Date of Initial Visit: Type: THERAPY  Noted: 2022  Today's Date: 2023  Patient seen for 19 sessions    Certification Period 2023 thru 2023       Visit Diagnoses:    ICD-10-CM ICD-9-CM   1. Chronic right shoulder pain  M25.511 719.41    G89.29 338.29       Subjective     Pt states that she feels continued improvement in the shoulder and she feels like her ROM and strength is progressing well. She has the most trouble with reaching behind her back at this point.     Objective   See Exercise, Manual, and Modality Logs for complete treatment.       Assessment/Plan     Pt is progressing well and she demonstrates an improvement in her tolerance to exercise in the clinic and an improvement in her ROM in all planes. Focused on functional activities and internal rotation stretching in the clinic today.       Timed:         Manual Therapy:    16     mins  79001;     Therapeutic Exercise:    10     mins  63692;     Neuromuscular Oracio:    30    mins  61451;    Therapeutic Activity:          mins  23779;     Gait Training:           mins  36063;     Ultrasound:          mins  31604;    Ionto                                   mins   21283  Self Care                            mins   54275  Canalith Repos         mins 12493  Electrical Stimulation:         mins  94326    Un-Timed:  Electrical Stimulation:         mins  80432 ( );  Dry Needling          mins self-pay  Traction          mins 72908      Timed Treatment:   56   mins   Total Treatment:     56   mins    Brandon Geiger PT, DPT, OCS, Cert. DN  [place] License: [id number]

## 2023-04-21 ENCOUNTER — TREATMENT (OUTPATIENT)
Dept: PHYSICAL THERAPY | Facility: CLINIC | Age: 63
End: 2023-04-21
Payer: COMMERCIAL

## 2023-04-21 DIAGNOSIS — G89.29 CHRONIC RIGHT SHOULDER PAIN: Primary | ICD-10-CM

## 2023-04-21 DIAGNOSIS — M25.511 CHRONIC RIGHT SHOULDER PAIN: Primary | ICD-10-CM

## 2023-04-21 PROCEDURE — 97110 THERAPEUTIC EXERCISES: CPT | Performed by: PHYSICAL THERAPIST

## 2023-04-21 PROCEDURE — 97140 MANUAL THERAPY 1/> REGIONS: CPT | Performed by: PHYSICAL THERAPIST

## 2023-04-21 PROCEDURE — 97112 NEUROMUSCULAR REEDUCATION: CPT | Performed by: PHYSICAL THERAPIST

## 2023-04-26 NOTE — PROGRESS NOTES
Physical Therapy Daily Treatment Note    [facility]   [address], [address] [place] [address]      Patient: Jessika Singletary   : 1960  Referring practitioner: Ciara Ashley MD  Date of Initial Visit: Type: THERAPY  Noted: 2022  Today's Date: 2023  Patient seen for 20 sessions    Certification Period 2023 thru 2023       Visit Diagnoses:    ICD-10-CM ICD-9-CM   1. Chronic right shoulder pain  M25.511 719.41    G89.29 338.29       Subjective     Pt states that she feels improved since beginning treatment and she has noticed significant improvement in the past several weeks. She is able to do more prolonged activity with the right arm while working without increased pain.     Objective   See Exercise, Manual, and Modality Logs for complete treatment.       Assessment/Plan     Pt is progressing well and she demonstrates an improvement in her overall strength, ROM, and tolerance to activity. Will cont to progress as indicated.       Timed:         Manual Therapy:    13     mins  32274;     Therapeutic Exercise:    12     mins  56659;     Neuromuscular Oracio:    30    mins  54840;    Therapeutic Activity:          mins  94727;     Gait Training:           mins  25707;     Ultrasound:          mins  04422;    Ionto                                   mins   54978  Self Care                            mins   67911  Canalith Repos         mins 99920  Electrical Stimulation:         mins  70071    Un-Timed:  Electrical Stimulation:         mins  07092 ( );  Dry Needling          mins self-pay  Traction          mins 16529      Timed Treatment:   55   mins   Total Treatment:     55   mins    Brandon Geiger, PT, DPT, OCS, Cert. DN  [place] License: [id number]

## 2023-04-28 ENCOUNTER — TREATMENT (OUTPATIENT)
Dept: PHYSICAL THERAPY | Facility: CLINIC | Age: 63
End: 2023-04-28
Payer: COMMERCIAL

## 2023-04-28 DIAGNOSIS — M25.511 CHRONIC RIGHT SHOULDER PAIN: Primary | ICD-10-CM

## 2023-04-28 DIAGNOSIS — G89.29 CHRONIC RIGHT SHOULDER PAIN: Primary | ICD-10-CM

## 2023-05-02 NOTE — PROGRESS NOTES
Physical Therapy Daily Treatment Note    [facility]   [address], [address] [place] [address]      Patient: Jessika Singletary   : 1960  Referring practitioner: Ciara Ashley MD  Date of Initial Visit: Type: THERAPY  Noted: 2022  Today's Date: 2023  Patient seen for 21 sessions    Certification Period 2023 thru 2023       Visit Diagnoses:    ICD-10-CM ICD-9-CM   1. Chronic right shoulder pain  M25.511 719.41    G89.29 338.29       Subjective     Pt states that she is feeling a little sore today, but it feels mostly like muscle soreness in the upper arm instead of pain in the shoulder. She feels like she is still a lot better.     Objective   See Exercise, Manual, and Modality Logs for complete treatment.       Assessment/Plan     Pt is progressing well and she demonstrates an improvement in her strength and tolerance to functional activity in the clinic. Will cont to progess as indicated.       Timed:         Manual Therapy:    16     mins  33780;     Therapeutic Exercise:    12     mins  27828;     Neuromuscular Oracio:    15    mins  52389;    Therapeutic Activity:     10     mins  85019;     Gait Training:           mins  36181;     Ultrasound:          mins  07654;    Ionto                                   mins   74623  Self Care                            mins   75197  Canalith Repos         mins 83216  Electrical Stimulation:         mins  19647    Un-Timed:  Electrical Stimulation:         mins  51827 ( );  Dry Needling          mins self-pay  Traction          mins 91331      Timed Treatment:   53   mins   Total Treatment:     53   mins    Brandon Geiger, PT, DPT, OCS, Cert. DN  [place] License: [id number]

## 2023-05-19 ENCOUNTER — TREATMENT (OUTPATIENT)
Dept: PHYSICAL THERAPY | Facility: CLINIC | Age: 63
End: 2023-05-19
Payer: COMMERCIAL

## 2023-05-19 DIAGNOSIS — M25.511 CHRONIC RIGHT SHOULDER PAIN: Primary | ICD-10-CM

## 2023-05-19 DIAGNOSIS — G89.29 CHRONIC RIGHT SHOULDER PAIN: Primary | ICD-10-CM

## 2023-05-19 NOTE — PROGRESS NOTES
Physical Therapy Daily Treatment Note    [facility]   [address], [address] [place] [address]      Patient: Jessika Singletary   : 1960  Referring practitioner: Ciara Ashley MD  Date of Initial Visit: Type: THERAPY  Noted: 2022  Today's Date: 2023  Patient seen for 22 sessions    Certification Period 2023 thru 2023       Visit Diagnoses:    ICD-10-CM ICD-9-CM   1. Chronic right shoulder pain  M25.511 719.41    G89.29 338.29       Subjective     Pt states that she has been feeling good over the past couple of weeks, even after not being in therapy last week. She did not have ay pain the first week and only mild pain the second week when she did not do as much exercise.     Objective   See Exercise, Manual, and Modality Logs for complete treatment.       Assessment/Plan     Pt is progressing well with therapy and she demonstrates continued impovement in her shoulder ROM in all planes. She was able to do exercise in the clinic today without exacerbation of symptoms and she demonstrated an improved strength and control of the right UE. Will cont to progress as indicated.       Timed:         Manual Therapy:    14     mins  36824;     Therapeutic Exercise:    10     mins  57303;     Neuromuscular Oracio:        mins  36842;    Therapeutic Activity:     32     mins  66295;     Gait Training:           mins  93165;     Ultrasound:          mins  29545;    Ionto                                   mins   49033  Self Care                            mins   36426  Canalith Repos         mins 40141  Electrical Stimulation:         mins  49617    Un-Timed:  Electrical Stimulation:         mins  25382 ( );  Dry Needling          mins self-pay  Traction          mins 75180      Timed Treatment:   56   mins   Total Treatment:     56   mins    Brandon Geiger, PT, DPT, OCS, Cert. DN  [place] License: [id number]

## 2023-05-26 ENCOUNTER — TREATMENT (OUTPATIENT)
Dept: PHYSICAL THERAPY | Facility: CLINIC | Age: 63
End: 2023-05-26
Payer: COMMERCIAL

## 2023-05-26 DIAGNOSIS — M25.511 CHRONIC RIGHT SHOULDER PAIN: Primary | ICD-10-CM

## 2023-05-26 DIAGNOSIS — G89.29 CHRONIC RIGHT SHOULDER PAIN: Primary | ICD-10-CM

## 2023-05-26 PROCEDURE — 97110 THERAPEUTIC EXERCISES: CPT | Performed by: PHYSICAL THERAPIST

## 2023-05-26 PROCEDURE — 97140 MANUAL THERAPY 1/> REGIONS: CPT | Performed by: PHYSICAL THERAPIST

## 2023-05-26 PROCEDURE — 97112 NEUROMUSCULAR REEDUCATION: CPT | Performed by: PHYSICAL THERAPIST

## 2023-05-26 NOTE — PROGRESS NOTES
Physical Therapy Daily Treatment Note    [facility]   [address], [address] [place] [address]      Patient: Jessika Singletary   : 1960  Referring practitioner: Ciara Ashley MD  Date of Initial Visit: Type: THERAPY  Noted: 2022  Today's Date: 2023  Patient seen for 23 sessions    Certification Period 2023 thru 2023       Visit Diagnoses:    ICD-10-CM ICD-9-CM   1. Chronic right shoulder pain  M25.511 719.41    G89.29 338.29       Subjective     Pt state that she felt very good after her previous visit and she had a decrease in her pain. She did the elliptical twice since then and she feels that this may have caused some increase in the shoulder pain she is having now.    Objective   See Exercise, Manual, and Modality Logs for complete treatment.       Assessment/Plan     Pt responded well to treatment and she reported much less pain after exercise and manual therapy. Despite increasing pain at times, her ROM has remained improved and she is able to perform all exercise without c/o increasing symptoms.       Timed:         Manual Therapy:    14     mins  36164;     Therapeutic Exercise:    10     mins  32893;     Neuromuscular Oracio:    30    mins  27248;    Therapeutic Activity:          mins  31362;     Gait Training:           mins  86119;     Ultrasound:          mins  44810;    Ionto                                   mins   57083  Self Care                            mins   43612  Canalith Repos         mins 85182  Electrical Stimulation:         mins  99151    Un-Timed:  Electrical Stimulation:         mins  83955 ( );  Dry Needling          mins self-pay  Traction          mins 59947      Timed Treatment:   54   mins   Total Treatment:     54   mins    Brandon Geiger, PT, DPT, OCS, Cert. DN  [place] License: [id number]

## 2023-06-09 ENCOUNTER — HOSPITAL ENCOUNTER (OUTPATIENT)
Dept: GENERAL RADIOLOGY | Facility: HOSPITAL | Age: 63
Discharge: HOME | End: 2023-06-09
Payer: COMMERCIAL

## 2023-06-09 ENCOUNTER — OFFICE VISIT (OUTPATIENT)
Dept: FAMILY MEDICINE CLINIC | Facility: CLINIC | Age: 63
End: 2023-06-09
Payer: COMMERCIAL

## 2023-06-09 VITALS
BODY MASS INDEX: 33.99 KG/M2 | OXYGEN SATURATION: 97 % | HEIGHT: 65 IN | SYSTOLIC BLOOD PRESSURE: 120 MMHG | WEIGHT: 204 LBS | DIASTOLIC BLOOD PRESSURE: 64 MMHG | HEART RATE: 68 BPM

## 2023-06-09 DIAGNOSIS — Z00.00 WELL ADULT EXAM: Primary | ICD-10-CM

## 2023-06-09 DIAGNOSIS — R73.03 PREDIABETES: ICD-10-CM

## 2023-06-09 DIAGNOSIS — M25.511 CHRONIC RIGHT SHOULDER PAIN: ICD-10-CM

## 2023-06-09 DIAGNOSIS — G89.29 CHRONIC RIGHT SHOULDER PAIN: ICD-10-CM

## 2023-06-09 DIAGNOSIS — Z23 NEED FOR VACCINATION: ICD-10-CM

## 2023-06-09 DIAGNOSIS — Z12.83 SKIN EXAM, SCREENING FOR CANCER: ICD-10-CM

## 2023-06-09 PROCEDURE — 73030 X-RAY EXAM OF SHOULDER: CPT

## 2023-06-09 PROCEDURE — 99396 PREV VISIT EST AGE 40-64: CPT | Performed by: FAMILY MEDICINE

## 2023-06-09 NOTE — PROGRESS NOTES
"Chief Complaint  Well adult exam and Annual Exam    Subjective          Jessika Singletary presents to [facility] for   History of Present Illness      She has cut back on portion size. Drinking more water at least 32 oz. No caffeine. Soda cut back as well.     She is doing cardio every day for an hour at gym. She goes 5 days a week.     She didn't start Symbicort inhaler from pulmonology visit.     She is in PT for right shoulder. Intermittent pain.     She doesn't have a hernia. When she does steps and walking a lot she has pressure in RLQ abdomen. Feels like rubbing.     She has several moles on her abdomen that bother her.        6/9/2023     3:23 PM   PHQ-2/PHQ-9 Depression Screening   Little Interest or Pleasure in Doing Things 0-->not at all   Feeling Down, Depressed or Hopeless 0-->not at all   Trouble Falling or Staying Asleep, or Sleeping Too Much 0-->not at all   Feeling Tired or Having Little Energy 0-->not at all   Poor Appetite or Overeating 0-->not at all   Feeling Bad about Yourself - or that You are a Failure or Have Let Yourself or Your Family Down 0-->not at all   Trouble Concentrating on Things, Such as Reading the Newspaper or Watching Television 0-->not at all   Moving or Speaking So Slowly that Other People Could Have Noticed? Or the Opposite - Being So Fidgety 0-->not at all   Thoughts that You Would be Better Off Dead or of Hurting Yourself in Some Way 0-->not at all   PHQ-9: Brief Depression Severity Measure Score 0   If You Checked Off Any Problems, How Difficult Have These Problems Made It For You to Do Your Work, Take Care of Things at Home, or Get Along with Other People? not difficult at all       Objective   Vital Signs:   Vitals:    06/09/23 1520   BP: 120/64   Pulse: 68   SpO2: 97%   Weight: 92.5 kg (204 lb)   Height: 165.1 cm (65\")     Body mass index is 33.95 kg/m².    BMI is >= 30 and <35. (Class 1 Obesity). The following options were offered after " discussion;: weight loss educational material (shared in after visit summary) and exercise counseling/recommendations          Physical Exam     General appearance well-appearing adult female obese.  Cardiovascular regular rate and rhythm no murmurs  Respiratory clear to auscultation bilaterally.  Abdomen soft, nontender, nondistended, no hernia  Skin multiple nevi on trunk  Psych normal mood and behavior  Musculoskeletal right shoulder decreased range of motion.  Strength intact.  No bony tenderness.  Bilateral rounded shoulders.    Result Review :            CT Abdomen Pelvis Without Contrast (10/18/2022 10:52)     Immunization History   Administered Date(s) Administered   • COVID-19 (PFIZER) BIVALENT 12+YRS 10/13/2022   • COVID-19 (PFIZER) Purple Cap Monovalent 01/12/2021, 02/09/2021, 10/15/2021   • Flu Vaccine Split Quad 10/10/2018   • FluLaval/Fluzone >6mos 11/23/2021, 11/23/2022   • Hepatitis A 11/10/2018, 05/13/2019   • Hepatitis B Adult/Adolescent IM 01/16/1986, 02/18/1986, 07/18/1986   • IPV 06/19/1972   • Influenza, Unspecified 10/10/2014, 10/23/2015, 11/07/2016, 11/03/2017, 10/12/2018, 11/06/2019, 11/04/2020   • MMR 03/07/1992, 11/25/1992   • Td 07/17/2001   • Tdap 11/21/2011       Health Maintenance   Topic Date Due   • Pneumococcal Vaccine 0-64 (1 - PCV) Never done   • ZOSTER VACCINE (1 of 2) Never done   • HEPATITIS C SCREENING  Never done   • ANNUAL PHYSICAL  06/30/2017   • TDAP/TD VACCINES (3 - Td or Tdap) 11/21/2021   • INFLUENZA VACCINE  08/01/2023   • MAMMOGRAM  02/15/2024   • PAP SMEAR  08/18/2025   • COLORECTAL CANCER SCREENING  11/24/2030   • COVID-19 Vaccine  Completed            Assessment and Plan    Diagnoses and all orders for this visit:    1. Well adult exam (Primary)  -     CBC (No Diff); Future  -     Comprehensive Metabolic Panel; Future  -     Lipid Panel; Future  -     TSH; Future  -     Hemoglobin A1c; Future  Cervical cancer screening Pap smear up-to-date  Colon cancer screening  colonoscopy up-to-date.  Breast cancer screening mammogram up-to-date.  Check fasting labs at outside lab.  2. Prediabetes  -     Hemoglobin A1c; Future    3. Need for vaccination  Recommend Tdap vaccine but she deferred to a later date.  Handout provided.  Recommend Shingrix vaccine series at local pharmacy.  4. Chronic right shoulder pain  -     MRI Shoulder Right Without Contrast; Future  -     XR Shoulder 2+ View Right; Future  Uncontrolled.  Start with x-rays today.  She has been to physical therapy with mild improvement.  She has restricted range of motion secondary to pain on exam.  Further investigation with MRI.  5. Skin exam, screening for cancer  -     Ambulatory Referral to Dermatology  Numerous moles will refer for mole check.      Counseling/anticipatory guidance: Nutrition handout, physical activity, immunizations, screenings      Follow Up   Return in about 1 year (around 6/9/2024) for Physical and fasting labs.  Patient was given instructions and counseling regarding her condition or for health maintenance advice. Please see specific information pulled into the AVS if appropriate.      Electronically signed by Ciara Ashley MD, 06/09/23, 4:26 PM EDT.

## 2023-06-12 ENCOUNTER — PATIENT MESSAGE (OUTPATIENT)
Dept: FAMILY MEDICINE CLINIC | Facility: CLINIC | Age: 63
End: 2023-06-12
Payer: COMMERCIAL

## 2023-06-12 DIAGNOSIS — Z00.00 WELL ADULT EXAM: Primary | ICD-10-CM

## 2023-06-12 PROBLEM — M19.011 PRIMARY OSTEOARTHRITIS OF RIGHT SHOULDER: Status: ACTIVE | Noted: 2023-06-12

## 2023-06-16 ENCOUNTER — LAB (OUTPATIENT)
Dept: LAB | Facility: HOSPITAL | Age: 63
End: 2023-06-16
Payer: COMMERCIAL

## 2023-06-16 DIAGNOSIS — Z00.00 WELL ADULT EXAM: ICD-10-CM

## 2023-06-16 LAB
ALBUMIN SERPL-MCNC: 4 G/DL (ref 3.5–5.2)
ALBUMIN/GLOB SERPL: 1.4 G/DL
ALP SERPL-CCNC: 75 U/L (ref 39–117)
ALT SERPL W P-5'-P-CCNC: 15 U/L (ref 1–33)
ANION GAP SERPL CALCULATED.3IONS-SCNC: 9.6 MMOL/L (ref 5–15)
AST SERPL-CCNC: 15 U/L (ref 1–32)
BILIRUB SERPL-MCNC: 1.2 MG/DL (ref 0–1.2)
BUN SERPL-MCNC: 10 MG/DL (ref 8–23)
BUN/CREAT SERPL: 9.9 (ref 7–25)
CALCIUM SPEC-SCNC: 10 MG/DL (ref 8.6–10.5)
CHLORIDE SERPL-SCNC: 106 MMOL/L (ref 98–107)
CHOLEST SERPL-MCNC: 183 MG/DL (ref 0–200)
CO2 SERPL-SCNC: 26.4 MMOL/L (ref 22–29)
CREAT SERPL-MCNC: 1.01 MG/DL (ref 0.57–1)
DEPRECATED RDW RBC AUTO: 40.6 FL (ref 37–54)
EGFRCR SERPLBLD CKD-EPI 2021: 62.7 ML/MIN/1.73
ERYTHROCYTE [DISTWIDTH] IN BLOOD BY AUTOMATED COUNT: 12.8 % (ref 12.3–15.4)
GLOBULIN UR ELPH-MCNC: 2.9 GM/DL
GLUCOSE SERPL-MCNC: 103 MG/DL (ref 65–99)
HBA1C MFR BLD: 6.1 % (ref 4.8–5.6)
HCT VFR BLD AUTO: 39.3 % (ref 34–46.6)
HDLC SERPL-MCNC: 51 MG/DL (ref 40–60)
HGB BLD-MCNC: 13.1 G/DL (ref 12–15.9)
LDLC SERPL CALC-MCNC: 118 MG/DL (ref 0–100)
LDLC/HDLC SERPL: 2.3 {RATIO}
MCH RBC QN AUTO: 28.9 PG (ref 26.6–33)
MCHC RBC AUTO-ENTMCNC: 33.3 G/DL (ref 31.5–35.7)
MCV RBC AUTO: 86.6 FL (ref 79–97)
PLATELET # BLD AUTO: 260 10*3/MM3 (ref 140–450)
PMV BLD AUTO: 11.3 FL (ref 6–12)
POTASSIUM SERPL-SCNC: 4.2 MMOL/L (ref 3.5–5.2)
PROT SERPL-MCNC: 6.9 G/DL (ref 6–8.5)
RBC # BLD AUTO: 4.54 10*6/MM3 (ref 3.77–5.28)
SODIUM SERPL-SCNC: 142 MMOL/L (ref 136–145)
TRIGL SERPL-MCNC: 73 MG/DL (ref 0–150)
TSH SERPL DL<=0.05 MIU/L-ACNC: 3.12 UIU/ML (ref 0.27–4.2)
VLDLC SERPL-MCNC: 14 MG/DL (ref 5–40)
WBC NRBC COR # BLD: 5.2 10*3/MM3 (ref 3.4–10.8)

## 2023-06-16 PROCEDURE — 80061 LIPID PANEL: CPT

## 2023-06-16 PROCEDURE — 83036 HEMOGLOBIN GLYCOSYLATED A1C: CPT

## 2023-06-16 PROCEDURE — 80050 GENERAL HEALTH PANEL: CPT

## 2023-07-11 ENCOUNTER — TELEPHONE (OUTPATIENT)
Dept: FAMILY MEDICINE CLINIC | Facility: CLINIC | Age: 63
End: 2023-07-11
Payer: COMMERCIAL

## 2023-08-09 ENCOUNTER — PATIENT MESSAGE (OUTPATIENT)
Dept: FAMILY MEDICINE CLINIC | Facility: CLINIC | Age: 63
End: 2023-08-09
Payer: COMMERCIAL

## 2023-08-18 ENCOUNTER — TREATMENT (OUTPATIENT)
Dept: PHYSICAL THERAPY | Facility: CLINIC | Age: 63
End: 2023-08-18
Payer: COMMERCIAL

## 2023-08-18 DIAGNOSIS — G89.29 CHRONIC RIGHT SHOULDER PAIN: Primary | ICD-10-CM

## 2023-08-18 DIAGNOSIS — M25.511 CHRONIC RIGHT SHOULDER PAIN: Primary | ICD-10-CM

## 2023-08-18 PROCEDURE — 97112 NEUROMUSCULAR REEDUCATION: CPT | Performed by: PHYSICAL THERAPIST

## 2023-08-18 PROCEDURE — 97140 MANUAL THERAPY 1/> REGIONS: CPT | Performed by: PHYSICAL THERAPIST

## 2023-08-18 PROCEDURE — 97110 THERAPEUTIC EXERCISES: CPT | Performed by: PHYSICAL THERAPIST

## 2023-08-18 NOTE — PROGRESS NOTES
Physical Therapy Re Certification Of Plan of Care    [facility]   [address], [address] [place] [address]    Patient: Jessika Singletary   : 1960  Diagnosis/ICD-10 Code:  Chronic right shoulder pain [M25.511, G89.29]  Referring practitioner: Ciara Ashley MD  Date of Initial Visit: Type: THERAPY  Noted: 2022  Today's Date: 2023  Patient seen for 27 sessions         Visit Diagnoses:    ICD-10-CM ICD-9-CM   1. Chronic right shoulder pain  M25.511 719.41    G89.29 338.29         Jessika Singletary reports that she is feeling good and she is able to do most activities without increasing pain. She does feel some tightness/stiffness in the shoulder, but this is much less frequent and does not limit her activity. She still feel enough of an issue that she is going to have an MRI next month.     Clinical Progress: improved  Home Program Compliance: Yes  Treatment has included: therapeutic exercise, neuromuscular re-education, manual therapy, and therapeutic activity      Subjective       Objective          Palpation     Additional Palpation Details  Mild hypertonicity and soreness noted in the right deltoid and upper trap     Active Range of Motion     Right Shoulder   Flexion: 160 degrees   Extension: 40 degrees   Abduction: 147 degrees   External rotation 0ø: 50 degrees   Internal rotation BTB: T7     Strength/Myotome Testing     Right Shoulder     Planes of Motion   Flexion: 5   Abduction: 5   External rotation at 0ø: 5   Internal rotation at 0ø: 5         Assessment/Plan    Pt has progressed very well with therapy and she demonstrate right shoulder AROM nearly WNL without pain at end range. Her overall strength is improved as well, although she does have some mild soreness when reaching overhead or exercises at/near her end ROM UE elevation. Will plan to hold therapy until after her MRI and then progress as appropriate depending on results and presentation at that time.         Recommendations: Continue with recommendations : will hold PT for now until after her MRI. If she feels that she has had some decline in her function or increase in pain at that time, will proceed based on the results.   Timeframe: 2 months  Prognosis to achieve goals: good      Timed:         Manual Therapy:    14     mins  65375;     Therapeutic Exercise:    14     mins  73021;     Neuromuscular Oracio:    28    mins  35513;    Therapeutic Activity:          mins  10723;     Gait Training:           mins  83316;     Ultrasound:          mins  63167;    Ionto                                   mins   06054  Self Care                            mins   33420  Canalith Repos         mins 85072      Un-Timed:  Electrical Stimulation:         mins  96049 ( );  Dry Needling          mins self-pay  Traction          mins 91075  Re-Eval                               mins  89283      Timed Treatment:   56   mins   Total Treatment:     56   mins          PT: Brandon Geiger PT, DPT, OCS, Cert. DN   [place] License:  [id number]    Electronically signed by Brandon Geiger PT, 08/18/23, 12:43 PM EDT    Certification Period: 8/18/2023 thru 11/15/2023  I certify that the therapy services are furnished while this patient is under my care.  The services outlined above are required by this patient, and will be reviewed every 90 days.         Physician Signature:__________________________________________________    PHYSICIAN: Ciara Ashley MD  NPI: [id number]                                      DATE:  :     Please sign and return via fax to [email or web address] . Thank you, [facility]

## 2023-09-22 ENCOUNTER — HOSPITAL ENCOUNTER (OUTPATIENT)
Dept: MRI IMAGING | Facility: HOSPITAL | Age: 63
Discharge: HOME | End: 2023-09-22
Admitting: FAMILY MEDICINE
Payer: COMMERCIAL

## 2023-09-22 DIAGNOSIS — G89.29 CHRONIC RIGHT SHOULDER PAIN: ICD-10-CM

## 2023-09-22 DIAGNOSIS — M25.511 CHRONIC RIGHT SHOULDER PAIN: ICD-10-CM

## 2023-09-22 PROCEDURE — 73221 MRI JOINT UPR EXTREM W/O DYE: CPT

## 2023-10-23 ENCOUNTER — PATIENT MESSAGE (OUTPATIENT)
Age: 63
End: 2023-10-23
Payer: COMMERCIAL

## 2023-10-23 DIAGNOSIS — G89.29 CHRONIC RIGHT SHOULDER PAIN: Primary | ICD-10-CM

## 2023-10-23 DIAGNOSIS — M25.511 CHRONIC RIGHT SHOULDER PAIN: Primary | ICD-10-CM

## 2023-12-01 ENCOUNTER — OFFICE VISIT (OUTPATIENT)
Age: 63
End: 2023-12-01
Payer: COMMERCIAL

## 2023-12-01 VITALS
HEART RATE: 68 BPM | DIASTOLIC BLOOD PRESSURE: 66 MMHG | SYSTOLIC BLOOD PRESSURE: 120 MMHG | OXYGEN SATURATION: 96 % | WEIGHT: 189.9 LBS | BODY MASS INDEX: 31.64 KG/M2 | HEIGHT: 65 IN

## 2023-12-01 DIAGNOSIS — E66.09 CLASS 1 OBESITY DUE TO EXCESS CALORIES WITH SERIOUS COMORBIDITY AND BODY MASS INDEX (BMI) OF 31.0 TO 31.9 IN ADULT: ICD-10-CM

## 2023-12-01 DIAGNOSIS — R73.03 PREDIABETES: Primary | ICD-10-CM

## 2023-12-01 LAB
EXPIRATION DATE: NORMAL
HBA1C MFR BLD: 5.5 % (ref 4.5–5.7)
Lab: NORMAL

## 2023-12-01 NOTE — PROGRESS NOTES
"Chief Complaint  Weight Check    Subjective        Jessika Singletary presents to [facility]  History of Present Illness    Exercising 5-6 times a week at the gym. 2 mile walk, stair step 8 min, elliptical 6 min, jump rope, and stretching. Plan to get a  and start yoga. Trying to eat better. She stays away from breads because it spikes her blood pressure. She had pizza and blood pressure was 190s. She reduces sodium. [place], brown rice and broccoli keeps her stable. Moderation. She drinks canda dry soda 1-2 cans per day.               Objective   Vital Signs:  /66   Pulse 68   Ht 165.1 cm (65\")   Wt 86.1 kg (189 lb 14.4 oz)   SpO2 96%   BMI 31.60 kg/m²   Estimated body mass index is 31.6 kg/m² as calculated from the following:    Height as of this encounter: 165.1 cm (65\").    Weight as of this encounter: 86.1 kg (189 lb 14.4 oz).               Physical Exam  Vitals reviewed.   Constitutional:       General: She is not in acute distress.     Appearance: She is obese. She is not ill-appearing.   Cardiovascular:      Rate and Rhythm: Normal rate and regular rhythm.   Pulmonary:      Effort: Pulmonary effort is normal.      Breath sounds: Normal breath sounds.   Neurological:      Mental Status: She is alert.   Psychiatric:         Mood and Affect: Mood normal.        Result Review :  The following data was reviewed by: Ciara Ashley MD on 12/01/2023:  Common labs          6/16/2023    08:39 12/1/2023    11:40   Common Labs   Glucose 103     BUN 10     Creatinine 1.01     Sodium 142     Potassium 4.2     Chloride 106     Calcium 10.0     Albumin 4.0     Total Bilirubin 1.2     Alkaline Phosphatase 75     AST (SGOT) 15     ALT (SGPT) 15     WBC 5.20     Hemoglobin 13.1     Hematocrit 39.3     Platelets 260     Total Cholesterol 183     Triglycerides 73     HDL Cholesterol 51     LDL Cholesterol  118     Hemoglobin A1C 6.10  5.5      A1C Last 3 Results          " 6/16/2023    08:39 12/1/2023    11:40   HGBA1C Last 3 Results   Hemoglobin A1C 6.10  5.5                   Assessment and Plan   Diagnoses and all orders for this visit:    1. Prediabetes (Primary)  -     POC Glycosylated Hemoglobin (Hb A1C)  Resolved through lifestyle changes.  Continue low-carb diet and encouraged to decrease sugary beverages.  Continue exercise.  2. Class 1 obesity due to excess calories with serious comorbidity and body mass index (BMI) of 31.0 to 31.9 in adult  BMI is >= 30 and <35. (Class 1 Obesity). The following options were offered after discussion;: exercise counseling/recommendations and nutrition counseling/recommendations  15 pound weight loss since last visit in June.       Follow Up   Return in about 28 weeks (around 6/14/2024) for Physical, as scheduled.  Patient was given instructions and counseling regarding her condition or for health maintenance advice. Please see specific information pulled into the AVS if appropriate.       Electronically signed by Ciara Ashley MD, 12/01/23, 11:40 AM EST.

## 2023-12-15 ENCOUNTER — TREATMENT (OUTPATIENT)
Dept: PHYSICAL THERAPY | Facility: CLINIC | Age: 63
End: 2023-12-15
Payer: COMMERCIAL

## 2023-12-15 DIAGNOSIS — G89.29 CHRONIC RIGHT SHOULDER PAIN: Primary | ICD-10-CM

## 2023-12-15 DIAGNOSIS — M25.511 CHRONIC RIGHT SHOULDER PAIN: Primary | ICD-10-CM

## 2023-12-15 PROCEDURE — 97110 THERAPEUTIC EXERCISES: CPT | Performed by: PHYSICAL THERAPIST

## 2023-12-15 PROCEDURE — 97140 MANUAL THERAPY 1/> REGIONS: CPT | Performed by: PHYSICAL THERAPIST

## 2023-12-15 PROCEDURE — 97161 PT EVAL LOW COMPLEX 20 MIN: CPT | Performed by: PHYSICAL THERAPIST

## 2023-12-15 NOTE — PROGRESS NOTES
Physical Therapy Initial Evaluation and Plan of Care    [facility]    [address], [address] [place] [address]    Patient: Jessika Singletary   : 1960  Diagnosis/ICD-10 Code:  Chronic right shoulder pain [M25.511, G89.29]  Referring practitioner: Ciara Ashley MD  Date of Initial Visit: 12/15/2023  Today's Date: 12/15/2023  Patient seen for 1 session         Visit Diagnoses:    ICD-10-CM ICD-9-CM   1. Chronic right shoulder pain  M25.511 719.41    G89.29 338.29           Subjective Evaluation    History of Present Illness  Mechanism of injury: Pt states that at this point she is having intermittent pain in the right shoulder and she feels like she has some days without pain and she is able to decrease her pain intensity with stretching and light exercise. She feels like her ROM has continued to be improved since her previous therapy sessions and she is still significantly improved overall. Her pain seems to be worse when she is working less and she feels like her shoulder is stiffening. Her pain is almost all at the base of neck and towards the shoulder on the right side.     Pt feels like she gets pain every 2-3 days and can occur several times throughout the day.      Patient Occupation: dental hygenist Quality of life: good    Pain  Current pain ratin  At best pain ratin  At worst pain ratin  Location: top of the right shouler - upper trap  Quality: dull ache  Relieving factors: rest and change in position (stretching, exercise)  Exacerbated by: decreasing movement/activity.  Progression: no change    Hand dominance: left    Diagnostic Tests  MRI studies: normal (see imaging section in epic)    Treatments  Previous treatment: physical therapy and massage  Patient Goals  Patient goals for therapy: decreased pain, increased motion and increased strength           Objective          Static Posture     Shoulders  Rounded.    Palpation     Additional Palpation  Details  Hypertonicity and TTP noted at the right upper trap and levator scapulae     Active Range of Motion     Right Shoulder   Flexion: 149 degrees   Extension: 50 degrees   Abduction: 138 degrees   External rotation 0°: 40 degrees   Internal rotation BTB: T7     Joint Play     Right Shoulder  Hypomobile in the posterior capsule.     Tests     Right Shoulder   Positive Hawkin's.   Negative empty can and full can.           Assessment & Plan       Assessment  Impairments: abnormal muscle tone, abnormal or restricted ROM, activity intolerance, impaired physical strength, lacks appropriate home exercise program and pain with function   Functional limitations: carrying objects, lifting, pulling, pushing, uncomfortable because of pain, reaching behind back, reaching overhead and unable to perform repetitive tasks   Assessment details: Patient is a 63 year old female who comes to physical therapy with c/o intermittent pain in the right shoulder. Signs and symptoms are consistent with possible right shoulder impingement with associated muscle guarding resulting in pain, decreased ROM, decreased strength, and inability to perform all essential functional activities. Pt will benefit from skilled PT services to address the above issues.     Prognosis details: SHORT TERM GOALS:     2 weeks  1. Pt I w/ HEP  2. Pt to demonstrate PROM of the right shoulder to WFL to improve ability to perform ADL's  3. Pt to demonstrate ability to perform 30 minutes continuous activity in the clinic without increase in pain     LONG TERM GOALS:   6 weeks  1. Pt to demonstrate AROM of the right shoulder to WNL to allow ability to perform all necessary functional activities  2. Pt to demonstrate ability to lift 5# OH with the right arm without increase in pain  3. Pt to report being able to work full duty without increase in pain in the shoulder  4. Pt to tolerate 60 minutes continuous activity in the clinic without increase in pain        Plan  Therapy options: will be seen for skilled therapy services  Planned modality interventions: cryotherapy, electrical stimulation/Russian stimulation, high voltage pulsed current (pain management), iontophoresis, microcurrent electrical stimulation, TENS, thermotherapy (hydrocollator packs) and ultrasound  Planned therapy interventions: abdominal trunk stabilization, ADL retraining, body mechanics training, flexibility, functional ROM exercises, home exercise program, IADL retraining, joint mobilization, manual therapy, neuromuscular re-education, postural training, soft tissue mobilization, spinal/joint mobilization, strengthening, stretching and therapeutic activities  Frequency: 2x week  Duration in weeks: 12  Treatment plan discussed with: patient        History # of Personal Factors and/or Comorbidities: LOW (0)  Examination of Body System(s): # of elements: LOW (1-2)  Clinical Presentation: STABLE   Clinical Decision Making: LOW       Timed:         Manual Therapy:    13     mins  38388;     Therapeutic Exercise:    10     mins  49201;     Neuromuscular Oracio:        mins  63271;    Therapeutic Activity:          mins  70387;     Gait Training:           mins  79857;     Ultrasound:          mins  18576;    Ionto                                   mins   03925  Self Care                            mins   80238  Canalith Repos         mins 14713      Un-Timed:  Electrical Stimulation:         mins  09115 ( );  Dry Needling          mins self-pay  Traction          mins 64095  Low Eval     28     Mins  52953  Mod Eval          Mins  40671  High Eval                            Mins  56339        Timed Treatment:   23   mins   Total Treatment:     51   mins          PT: Brandon Geiger PT, DPT, OCS, Cert. DN   License Number: [id number]  Electronically signed by Brandon Geiger PT, 12/15/23, 11:10 AM EST    Certification Period: 12/15/2023 thru 3/13/2024  I certify that the therapy services are furnished  while this patient is under my care.  The services outlined above are required by this patient, and will be reviewed every 90 days.         Physician Signature:__________________________________________________    PHYSICIAN: Ciara Ashley MD  NPI: [id number]                                      DATE:      Please sign and return via fax to [email or web address] . Thank you, [facility].

## 2023-12-26 ENCOUNTER — TRANSCRIBE ORDERS (OUTPATIENT)
Dept: ADMINISTRATIVE | Facility: HOSPITAL | Age: 63
End: 2023-12-26
Payer: COMMERCIAL

## 2023-12-26 DIAGNOSIS — Z12.31 SCREENING MAMMOGRAM FOR BREAST CANCER: Primary | ICD-10-CM

## 2023-12-28 ENCOUNTER — TREATMENT (OUTPATIENT)
Dept: PHYSICAL THERAPY | Facility: CLINIC | Age: 63
End: 2023-12-28
Payer: COMMERCIAL

## 2023-12-28 DIAGNOSIS — M25.511 CHRONIC RIGHT SHOULDER PAIN: Primary | ICD-10-CM

## 2023-12-28 DIAGNOSIS — G89.29 CHRONIC RIGHT SHOULDER PAIN: Primary | ICD-10-CM

## 2023-12-28 PROCEDURE — 97110 THERAPEUTIC EXERCISES: CPT | Performed by: PHYSICAL THERAPIST

## 2023-12-28 PROCEDURE — 97140 MANUAL THERAPY 1/> REGIONS: CPT | Performed by: PHYSICAL THERAPIST

## 2023-12-28 PROCEDURE — 97112 NEUROMUSCULAR REEDUCATION: CPT | Performed by: PHYSICAL THERAPIST

## 2024-01-02 NOTE — PROGRESS NOTES
Physical Therapy Daily Treatment Note    [facility]   [address], [address] [place] [address]      Patient: Jessika Singletary   : 1960  Referring practitioner: Ciara Ashley MD  Date of Initial Visit: Type: THERAPY  Noted: 12/15/2023  Today's Date: 2024  Patient seen for 2 sessions    Certification Period 2024 thru 3/31/2024       Visit Diagnoses:    ICD-10-CM ICD-9-CM   1. Chronic right shoulder pain  M25.511 719.41    G89.29 338.29       Subjective     Pt states that she is feeling improvement in the shoulder and she has less pain with all activity. She occasionally had some soreness after working, but exercise and stretching of the right shoulder consistently decreases her pain.     Objective   See Exercise, Manual, and Modality Logs for complete treatment.       Assessment/Plan     Pt is progressing well and she demonstrates good strength and control of the right shoulder. She does have some mild fatigue with prolonged activity and soreness when lifting overhead, but this has been steadily improving. Will cont to progress as indicated.       Timed:         Manual Therapy:    14     mins  37091;     Therapeutic Exercise:    10     mins  05245;     Neuromuscular Oracio:    30    mins  46346;    Therapeutic Activity:          mins  88444;     Gait Training:           mins  46109;     Ultrasound:          mins  43635;    Ionto                                   mins   94072  Self Care                            mins   88773  Canalith Repos         mins 90983  Electrical Stimulation:         mins  53702    Un-Timed:  Electrical Stimulation:         mins  73929 ( );  Dry Needling          mins self-pay  Traction          mins 63248      Timed Treatment:   54   mins   Total Treatment:     54   mins    Brandon Geiger, PT, DPT, OCS, Cert. DN  [place] License: [id number]

## 2024-01-12 ENCOUNTER — TREATMENT (OUTPATIENT)
Dept: PHYSICAL THERAPY | Facility: CLINIC | Age: 64
End: 2024-01-12
Payer: COMMERCIAL

## 2024-01-12 DIAGNOSIS — M25.511 CHRONIC RIGHT SHOULDER PAIN: Primary | ICD-10-CM

## 2024-01-12 DIAGNOSIS — G89.29 CHRONIC RIGHT SHOULDER PAIN: Primary | ICD-10-CM

## 2024-01-12 PROCEDURE — 97110 THERAPEUTIC EXERCISES: CPT | Performed by: PHYSICAL THERAPIST

## 2024-01-12 PROCEDURE — 97112 NEUROMUSCULAR REEDUCATION: CPT | Performed by: PHYSICAL THERAPIST

## 2024-01-12 PROCEDURE — 97140 MANUAL THERAPY 1/> REGIONS: CPT | Performed by: PHYSICAL THERAPIST

## 2024-01-19 NOTE — PROGRESS NOTES
Physical Therapy Daily Treatment Note    [facility]   [address], [address] [place] [address]      Patient: Jessika Singletary   : 1960  Referring practitioner: Ciara Ashley MD  Date of Initial Visit: Type: THERAPY  Noted: 12/15/2023  Today's Date: 2024  Patient seen for 3 sessions    Certification Period 2024 thru 2024       Visit Diagnoses:    ICD-10-CM ICD-9-CM   1. Chronic right shoulder pain  M25.511 719.41    G89.29 338.29       Subjective     Pt states that she is feeling good overall and she only has minimal intermittent discomfort in the shoulder. She reports moderate compliance with her HEP.     Objective   See Exercise, Manual, and Modality Logs for complete treatment.       Assessment/Plan     Reinforced importance of consistency with her HEP in being able to maintain pain relief in the right shoulder. Pt will follow up in several weeks to assess her response to independent exercise program.       Timed:         Manual Therapy:    12     mins  97161;     Therapeutic Exercise:    14     mins  50121;     Neuromuscular Oracio:    32    mins  51575;    Therapeutic Activity:          mins  11873;     Gait Training:           mins  16917;     Ultrasound:          mins  29728;    Ionto                                   mins   19779  Self Care                            mins   04938  Canalith Repos         mins 87203  Electrical Stimulation:         mins  75857    Un-Timed:  Electrical Stimulation:         mins  16855 ( );  Dry Needling          mins self-pay  Traction          mins 35721      Timed Treatment:   58   mins   Total Treatment:     58   mins    Brandon Geiger, PT, DPT, OCS, Cert. DN  [place] License: [id number]

## 2024-02-09 ENCOUNTER — TREATMENT (OUTPATIENT)
Dept: PHYSICAL THERAPY | Facility: CLINIC | Age: 64
End: 2024-02-09
Payer: COMMERCIAL

## 2024-02-09 DIAGNOSIS — M25.511 CHRONIC RIGHT SHOULDER PAIN: Primary | ICD-10-CM

## 2024-02-09 DIAGNOSIS — G89.29 CHRONIC RIGHT SHOULDER PAIN: Primary | ICD-10-CM

## 2024-03-07 ENCOUNTER — HOSPITAL ENCOUNTER (OUTPATIENT)
Dept: MAMMOGRAPHY | Facility: HOSPITAL | Age: 64
Discharge: HOME | End: 2024-03-07
Admitting: FAMILY MEDICINE
Payer: COMMERCIAL

## 2024-03-07 DIAGNOSIS — Z12.31 SCREENING MAMMOGRAM FOR BREAST CANCER: ICD-10-CM

## 2024-03-07 PROCEDURE — 77063 BREAST TOMOSYNTHESIS BI: CPT

## 2024-03-07 PROCEDURE — 77067 SCR MAMMO BI INCL CAD: CPT

## 2024-05-08 ENCOUNTER — PATIENT MESSAGE (OUTPATIENT)
Age: 64
End: 2024-05-08
Payer: COMMERCIAL

## 2024-05-10 ENCOUNTER — OFFICE VISIT (OUTPATIENT)
Age: 64
End: 2024-05-10
Payer: COMMERCIAL

## 2024-05-10 VITALS
DIASTOLIC BLOOD PRESSURE: 70 MMHG | BODY MASS INDEX: 32.57 KG/M2 | OXYGEN SATURATION: 95 % | HEART RATE: 75 BPM | SYSTOLIC BLOOD PRESSURE: 128 MMHG | HEIGHT: 65 IN | WEIGHT: 195.5 LBS

## 2024-05-10 DIAGNOSIS — R10.31 CHRONIC RLQ PAIN: Primary | ICD-10-CM

## 2024-05-10 DIAGNOSIS — G89.29 CHRONIC RLQ PAIN: Primary | ICD-10-CM

## 2024-05-10 PROCEDURE — 99214 OFFICE O/P EST MOD 30 MIN: CPT | Performed by: FAMILY MEDICINE

## 2024-05-11 ENCOUNTER — HOSPITAL ENCOUNTER (OUTPATIENT)
Facility: HOSPITAL | Age: 64
Discharge: HOME | End: 2024-05-11
Admitting: FAMILY MEDICINE
Payer: COMMERCIAL

## 2024-05-11 DIAGNOSIS — G89.29 CHRONIC RLQ PAIN: ICD-10-CM

## 2024-05-11 DIAGNOSIS — R10.31 CHRONIC RLQ PAIN: ICD-10-CM

## 2024-05-11 LAB — CREAT BLDA-MCNC: 1 MG/DL (ref 0.6–1.3)

## 2024-05-11 PROCEDURE — 82565 ASSAY OF CREATININE: CPT

## 2024-05-11 PROCEDURE — 74177 CT ABD & PELVIS W/CONTRAST: CPT

## 2024-05-11 PROCEDURE — 25510000001 IOPAMIDOL 61 % SOLUTION: Performed by: FAMILY MEDICINE

## 2024-05-11 RX ADMIN — IOPAMIDOL 100 ML: 612 INJECTION, SOLUTION INTRAVENOUS at 10:09

## 2024-05-13 ENCOUNTER — PATIENT MESSAGE (OUTPATIENT)
Age: 64
End: 2024-05-13
Payer: COMMERCIAL

## 2024-10-25 ENCOUNTER — OFFICE VISIT (OUTPATIENT)
Age: 64
End: 2024-10-25
Payer: COMMERCIAL

## 2024-10-25 VITALS
SYSTOLIC BLOOD PRESSURE: 130 MMHG | DIASTOLIC BLOOD PRESSURE: 80 MMHG | RESPIRATION RATE: 18 BRPM | HEIGHT: 65 IN | HEART RATE: 77 BPM | OXYGEN SATURATION: 98 % | BODY MASS INDEX: 32.75 KG/M2 | WEIGHT: 196.56 LBS

## 2024-10-25 DIAGNOSIS — R73.03 PREDIABETES: ICD-10-CM

## 2024-10-25 DIAGNOSIS — J45.20 MILD INTERMITTENT ASTHMA WITHOUT COMPLICATION: ICD-10-CM

## 2024-10-25 DIAGNOSIS — E66.811 CLASS 1 OBESITY DUE TO EXCESS CALORIES WITH SERIOUS COMORBIDITY AND BODY MASS INDEX (BMI) OF 32.0 TO 32.9 IN ADULT: ICD-10-CM

## 2024-10-25 DIAGNOSIS — E55.9 VITAMIN D DEFICIENCY: ICD-10-CM

## 2024-10-25 DIAGNOSIS — Z00.00 WELL ADULT EXAM: Primary | ICD-10-CM

## 2024-10-25 DIAGNOSIS — E66.09 CLASS 1 OBESITY DUE TO EXCESS CALORIES WITH SERIOUS COMORBIDITY AND BODY MASS INDEX (BMI) OF 32.0 TO 32.9 IN ADULT: ICD-10-CM

## 2024-10-25 PROCEDURE — 99396 PREV VISIT EST AGE 40-64: CPT | Performed by: FAMILY MEDICINE

## 2024-10-25 RX ORDER — ALBUTEROL SULFATE 90 UG/1
2 INHALANT RESPIRATORY (INHALATION) EVERY 4 HOURS PRN
Qty: 18 G | Refills: 3 | Status: SHIPPED | OUTPATIENT
Start: 2024-10-25

## 2024-10-25 NOTE — PROGRESS NOTES
"Chief Complaint  Annual Exam    Subjective              Jessika Singletary presents to [facility] for   History of Present Illness  History of Present Illness  The patient is a 63-year-old female presenting today for an annual physical exam.    She plans to receive her influenza vaccine at her workplace. She is not interested in receiving a tetanus booster or a seasonal COVID-19 vaccine booster today. She has considered the shingles vaccine but has not yet received it.    Her only prescription medication is Symbicort, which she does not use daily and believes may have . She does not take any vitamins or supplements. She occasionally experiences wheezing, which resolves on its own.    She has been consuming a significant amount of snacks and tends to eat when stressed. She has experienced abdominal pain in the past, which was evaluated with a CT scan. She has noticed that her symptoms improve with weight loss and a healthy diet.    She has a tattoo but reports no swelling of her lymph nodes. She has been maintaining a medical log and monitoring her blood pressure.    SOCIAL HISTORY   She has not smoked in over 20 years.    Objective   Vital Signs:   Vitals:    10/25/24 1021   BP: 130/80   BP Location: Right arm   Patient Position: Sitting   Cuff Size: Adult   Pulse: 77   Resp: 18   SpO2: 98%   Weight: 89.2 kg (196 lb 9 oz)   Height: 165.1 cm (65\")     Body mass index is 32.71 kg/m².              The following portions of the patient's history were reviewed and updated as appropriate: allergies, current medications, past family history, past medical history, past social history, past surgical history, and problem list.    Physical Exam  Constitutional:       General: She is not in acute distress.     Appearance: She is obese.   HENT:      Right Ear: Tympanic membrane and ear canal normal.      Left Ear: Tympanic membrane and ear canal normal.      Nose: No congestion or rhinorrhea. "      Mouth/Throat:      Mouth: Mucous membranes are moist.      Pharynx: No oropharyngeal exudate or posterior oropharyngeal erythema.   Eyes:      General:         Right eye: No discharge.         Left eye: No discharge.      Conjunctiva/sclera: Conjunctivae normal.   Neck:      Thyroid: No thyromegaly.   Cardiovascular:      Rate and Rhythm: Normal rate and regular rhythm.   Pulmonary:      Effort: Pulmonary effort is normal.      Breath sounds: Normal breath sounds.   Abdominal:      Palpations: Abdomen is soft. There is no hepatomegaly.      Tenderness: There is no abdominal tenderness.   Musculoskeletal:      Cervical back: Neck supple.   Lymphadenopathy:      Head:      Right side of head: No submandibular, preauricular or posterior auricular adenopathy.      Left side of head: No submandibular, preauricular or posterior auricular adenopathy.      Cervical: No cervical adenopathy.   Skin:     General: Skin is warm.   Neurological:      Mental Status: She is alert and oriented to person, place, and time.   Psychiatric:         Mood and Affect: Mood normal.         Behavior: Behavior normal.         Thought Content: Thought content normal.         Judgment: Judgment normal.        Result Review :                Immunization History   Administered Date(s) Administered    COVID-19 (PFIZER) BIVALENT 12+YRS 10/13/2022    COVID-19 (PFIZER) Purple Cap Monovalent 01/12/2021, 02/09/2021, 10/15/2021    Flu Vaccine Split Quad 10/10/2018    Fluzone (or Fluarix & Flulaval for VFC) >6mos 11/23/2021, 11/23/2022    Hepatitis A 11/10/2018, 05/13/2019    Hepatitis B Adult/Adolescent IM 01/16/1986, 02/18/1986, 07/18/1986    IPV 06/19/1972    Influenza, Unspecified 10/10/2014, 10/23/2015, 11/07/2016, 11/03/2017, 10/12/2018, 11/06/2019, 11/04/2020, 11/22/2023    MMR 03/07/1992, 11/25/1992    PPD Test 11/27/2023    Td (TDVAX) 07/17/2001    Tdap 11/21/2011       Health Maintenance   Topic Date Due    Pneumococcal Vaccine 0-64 (1 of 2  - PCV) Never done    ZOSTER VACCINE (1 of 2) Never done    HEPATITIS C SCREENING  Never done    TDAP/TD VACCINES (3 - Td or Tdap) 11/21/2021    HEMOGLOBIN A1C  03/01/2024    ANNUAL PHYSICAL  06/09/2024    INFLUENZA VACCINE  08/01/2024    COVID-19 Vaccine (5 - 2023-24 season) 09/01/2024    BMI FOLLOWUP  12/01/2024    MAMMOGRAM  03/07/2025    PAP SMEAR  08/18/2025    COLORECTAL CANCER SCREENING  11/24/2025            Assessment and Plan    Diagnoses and all orders for this visit:    1. Well adult exam (Primary)  -     CBC (No Diff); Future  -     Comprehensive Metabolic Panel; Future  -     Lipid Panel; Future  -     Hemoglobin A1c; Future  -     TSH Rfx On Abnormal To Free T4; Future    2. Class 1 obesity due to excess calories with serious comorbidity and body mass index (BMI) of 32.0 to 32.9 in adult    3. Prediabetes    4. Vitamin D deficiency  -     Vitamin D,25-Hydroxy; Future    5. Mild intermittent asthma without complication  -     albuterol sulfate  (90 Base) MCG/ACT inhaler; Inhale 2 puffs Every 4 (Four) Hours As Needed for Wheezing or Shortness of Air.  Dispense: 18 g; Refill: 3    BMI is >= 30 and <35. (Class 1 Obesity). The following options were offered after discussion;: nutrition counseling/recommendations      Assessment & Plan  1. Annual Physical Exam.  Vital signs are within normal limits. Weight is stable at 196 pounds. A fasting panel will be conducted to assess cholesterol, blood sugar, A1c, kidney function, liver function, electrolytes, thyroid function, anemia, and vitamin D levels. She will return within the next month for fasting blood work.     2. Asthma.  Asthma is well controlled with infrequent symptoms. A prescription for an albuterol inhaler has been sent to her pharmacy for use as needed for wheezing. She does not need to restart Symbicort at this time.    3. Health Maintenance.  The last tetanus vaccine was administered in 2011. She is advised to receive a tetanus booster. She  is recommended to get the Prevnar 20 pneumonia vaccine after turning 65. She is advised to get the Shingrix shingles vaccine, which is a two-dose series given at the pharmacy. She will continue to get her flu vaccine at work. A mammogram conducted on 03/07/2024 showed no abnormalities, and it is recommended to repeat annually. A colonoscopy performed on 11/24/2020 revealed a tubular adenoma type of colon polyp, and she is due for her next colonoscopy in 1 year.    4. Nutrition and Weight Management.  She has experienced changes in eating habits due to stress and late-night studying, leading to increased snacking and weight gain. She is advised to follow a low-carb, high-protein diet and reduce added sugars. Exercise is encouraged to help manage prediabetes and overall health. A women's multivitamin over 50 is recommended to include vitamin D, calcium, B vitamins, and folic acid. Her vitamin D level will be checked with the blood work to determine if prescription strength vitamin D is needed.    Follow-up  Return in 1 year for her annual physical or sooner if necessary.    Counseling/anticipatory guidance: Nutrition, physical activity,  immunizations, screenings      Follow Up   Return in about 1 year (around 10/26/2025) for Physical and fasting labs.  Patient was given instructions and counseling regarding her condition or for health maintenance advice. Please see specific information pulled into the AVS if appropriate.     Patient or patient representative verbalized consent for the use of Ambient Listening during the visit with  Ciara Ashley MD for chart documentation. 10/25/2024  11:02 EDT       Electronically signed by Ciara Ashley MD, 10/25/24, 11:02 AM EDT.

## 2024-10-31 ENCOUNTER — TRANSCRIBE ORDERS (OUTPATIENT)
Dept: ADMINISTRATIVE | Facility: HOSPITAL | Age: 64
End: 2024-10-31
Payer: COMMERCIAL

## 2024-10-31 DIAGNOSIS — Z12.31 VISIT FOR SCREENING MAMMOGRAM: Primary | ICD-10-CM

## 2024-11-02 ENCOUNTER — LAB (OUTPATIENT)
Dept: LAB | Facility: HOSPITAL | Age: 64
End: 2024-11-02
Payer: COMMERCIAL

## 2024-11-02 DIAGNOSIS — E55.9 VITAMIN D DEFICIENCY: ICD-10-CM

## 2024-11-02 DIAGNOSIS — Z00.00 WELL ADULT EXAM: ICD-10-CM

## 2024-11-02 LAB
25(OH)D3 SERPL-MCNC: 13.2 NG/ML (ref 30–100)
ALBUMIN SERPL-MCNC: 4.1 G/DL (ref 3.5–5.2)
ALBUMIN/GLOB SERPL: 1.4 G/DL
ALP SERPL-CCNC: 79 U/L (ref 39–117)
ALT SERPL W P-5'-P-CCNC: 17 U/L (ref 1–33)
ANION GAP SERPL CALCULATED.3IONS-SCNC: 7.5 MMOL/L (ref 5–15)
AST SERPL-CCNC: 19 U/L (ref 1–32)
BILIRUB SERPL-MCNC: 1.2 MG/DL (ref 0–1.2)
BUN SERPL-MCNC: 12 MG/DL (ref 8–23)
BUN/CREAT SERPL: 12 (ref 7–25)
CALCIUM SPEC-SCNC: 9.9 MG/DL (ref 8.6–10.5)
CHLORIDE SERPL-SCNC: 108 MMOL/L (ref 98–107)
CHOLEST SERPL-MCNC: 192 MG/DL (ref 0–200)
CO2 SERPL-SCNC: 28.5 MMOL/L (ref 22–29)
CREAT SERPL-MCNC: 1 MG/DL (ref 0.57–1)
DEPRECATED RDW RBC AUTO: 42.4 FL (ref 37–54)
EGFRCR SERPLBLD CKD-EPI 2021: 63 ML/MIN/1.73
ERYTHROCYTE [DISTWIDTH] IN BLOOD BY AUTOMATED COUNT: 13 % (ref 12.3–15.4)
GLOBULIN UR ELPH-MCNC: 3 GM/DL
GLUCOSE SERPL-MCNC: 105 MG/DL (ref 65–99)
HBA1C MFR BLD: 6 % (ref 4.8–5.6)
HCT VFR BLD AUTO: 41.2 % (ref 34–46.6)
HDLC SERPL-MCNC: 59 MG/DL (ref 40–60)
HGB BLD-MCNC: 13.7 G/DL (ref 12–15.9)
LDLC SERPL CALC-MCNC: 123 MG/DL (ref 0–100)
LDLC/HDLC SERPL: 2.08 {RATIO}
MCH RBC QN AUTO: 29.9 PG (ref 26.6–33)
MCHC RBC AUTO-ENTMCNC: 33.3 G/DL (ref 31.5–35.7)
MCV RBC AUTO: 90 FL (ref 79–97)
PLATELET # BLD AUTO: 269 10*3/MM3 (ref 140–450)
PMV BLD AUTO: 11.7 FL (ref 6–12)
POTASSIUM SERPL-SCNC: 4.1 MMOL/L (ref 3.5–5.2)
PROT SERPL-MCNC: 7.1 G/DL (ref 6–8.5)
RBC # BLD AUTO: 4.58 10*6/MM3 (ref 3.77–5.28)
SODIUM SERPL-SCNC: 144 MMOL/L (ref 136–145)
TRIGL SERPL-MCNC: 52 MG/DL (ref 0–150)
TSH SERPL DL<=0.05 MIU/L-ACNC: 1.91 UIU/ML (ref 0.27–4.2)
VLDLC SERPL-MCNC: 10 MG/DL (ref 5–40)
WBC NRBC COR # BLD AUTO: 4.89 10*3/MM3 (ref 3.4–10.8)

## 2024-11-02 PROCEDURE — 80061 LIPID PANEL: CPT

## 2024-11-02 PROCEDURE — 83036 HEMOGLOBIN GLYCOSYLATED A1C: CPT

## 2024-11-02 PROCEDURE — 80050 GENERAL HEALTH PANEL: CPT

## 2024-11-02 PROCEDURE — 82306 VITAMIN D 25 HYDROXY: CPT

## 2024-11-04 RX ORDER — ERGOCALCIFEROL 1.25 MG/1
50000 CAPSULE, LIQUID FILLED ORAL
Qty: 12 CAPSULE | Refills: 1 | Status: SHIPPED | OUTPATIENT
Start: 2024-11-04 | End: 2025-05-03

## 2025-03-12 LAB
NCCN CRITERIA FLAG: NORMAL
TYRER CUZICK SCORE: 5.3

## 2025-03-13 ENCOUNTER — HOSPITAL ENCOUNTER (OUTPATIENT)
Dept: MAMMOGRAPHY | Facility: HOSPITAL | Age: 65
Discharge: HOME | End: 2025-03-13
Admitting: FAMILY MEDICINE
Payer: COMMERCIAL

## 2025-03-13 DIAGNOSIS — Z12.31 VISIT FOR SCREENING MAMMOGRAM: ICD-10-CM

## 2025-03-13 PROCEDURE — 77063 BREAST TOMOSYNTHESIS BI: CPT

## 2025-03-13 PROCEDURE — 77067 SCR MAMMO BI INCL CAD: CPT

## 2025-05-02 ENCOUNTER — OFFICE VISIT (OUTPATIENT)
Age: 65
End: 2025-05-02
Payer: COMMERCIAL

## 2025-05-02 VITALS
WEIGHT: 202.44 LBS | HEART RATE: 86 BPM | DIASTOLIC BLOOD PRESSURE: 80 MMHG | HEIGHT: 65 IN | OXYGEN SATURATION: 96 % | SYSTOLIC BLOOD PRESSURE: 136 MMHG | BODY MASS INDEX: 33.73 KG/M2

## 2025-05-02 DIAGNOSIS — E66.811 CLASS 1 OBESITY DUE TO EXCESS CALORIES WITH SERIOUS COMORBIDITY AND BODY MASS INDEX (BMI) OF 33.0 TO 33.9 IN ADULT: ICD-10-CM

## 2025-05-02 DIAGNOSIS — I10 PRIMARY HYPERTENSION: ICD-10-CM

## 2025-05-02 DIAGNOSIS — R73.03 PREDIABETES: Primary | ICD-10-CM

## 2025-05-02 DIAGNOSIS — E66.09 CLASS 1 OBESITY DUE TO EXCESS CALORIES WITH SERIOUS COMORBIDITY AND BODY MASS INDEX (BMI) OF 33.0 TO 33.9 IN ADULT: ICD-10-CM

## 2025-05-02 LAB
EXPIRATION DATE: ABNORMAL
HBA1C MFR BLD: 5.8 % (ref 4.5–5.7)
Lab: ABNORMAL

## 2025-05-02 NOTE — LETTER
May 2, 2025     Patient: Jessika Singletary   YOB: 1960   Date of Visit: 5/2/2025       To Whom It May Concern:    It is my medical opinion that Jessika Singletary may return to work in one day.            Sincerely,        Ciara Ashley MD    CC: No Recipients

## 2025-05-02 NOTE — PROGRESS NOTES
"Chief Complaint  Prediabetes and BP concerns     Subjective        Jessika Singletary presents to [facility]  History of Present Illness    History of Present Illness  The patient is a 64-year-old female presenting for follow-up of prediabetes and blood pressure concerns.    A consistent diet and exercise regimen have not been maintained, with plans to resume these activities as the weather warms. Nutritional goals include home-cooked meals, specifically planning meals for the week, focusing on lunch and dinner, while breakfast will be more flexible. A predilection for sweets and late-night snacking is acknowledged, which she intends to curb. Occasional headaches are reported when consuming excessive sweets, such as donuts and cookies, but efforts have been made in the past month to reduce sugar intake.  For example, and said having 3 donuts she only had 1 doughnut.  Increased water consumption and awareness of the need for self-care as she ages are noted.    Inconsistent blood pressure readings are reported, monitored at home without any associated symptoms such as headaches or dizziness. Blood pressure readings have fluctuated, with systolic readings around 130 in early 04/2025, rising to 150 or 155 towards the end of the month. These fluctuations are attributed to dietary changes. A preference for lifestyle modifications over pharmacological interventions for blood pressure management is expressed.    The A1c test result from today's visit is 5.8, showing improvement from the previous result of 6.0 in 11/2024.    FAMILY HISTORY  Her mother, father, and three brothers have high blood pressure.    Objective   Vital Signs:  /80 (BP Location: Left arm, Patient Position: Sitting, Cuff Size: Adult)   Pulse 86   Ht 165.1 cm (65\")   Wt 91.8 kg (202 lb 7 oz)   SpO2 96%   BMI 33.69 kg/m²   Estimated body mass index is 33.69 kg/m² as calculated from the following:    Height as of this " "encounter: 165.1 cm (65\").    Weight as of this encounter: 91.8 kg (202 lb 7 oz).            The following portions of the patient's history were reviewed and updated as appropriate: allergies, current medications, past family history, past medical history, past social history, past surgical history, and problem list.       Physical Exam  Vitals reviewed.   Constitutional:       General: She is not in acute distress.     Appearance: She is obese. She is not ill-appearing.   Cardiovascular:      Rate and Rhythm: Normal rate and regular rhythm.   Pulmonary:      Effort: Pulmonary effort is normal.      Breath sounds: Normal breath sounds.   Neurological:      Mental Status: She is alert.        Result Review :  The following data was reviewed by: Ciara Ashley MD on 05/02/2025:  Common labs          5/11/2024    10:08 11/2/2024    09:09 5/2/2025    13:24   Common Labs   Glucose  105     BUN  12     Creatinine 1.00  1.00     Sodium  144     Potassium  4.1     Chloride  108     Calcium  9.9     Albumin  4.1     Total Bilirubin  1.2     Alkaline Phosphatase  79     AST (SGOT)  19     ALT (SGPT)  17     WBC  4.89     Hemoglobin  13.7     Hematocrit  41.2     Platelets  269     Total Cholesterol  192     Triglycerides  52     HDL Cholesterol  59     LDL Cholesterol   123     Hemoglobin A1C  6.00  5.8      A1C Last 3 Results          11/2/2024    09:09 5/2/2025    13:24   HGBA1C Last 3 Results   Hemoglobin A1C 6.00  5.8                Assessment and Plan   Diagnoses and all orders for this visit:    1. Prediabetes (Primary)  -     POC Glycosylated Hemoglobin (Hb A1C)    2. Primary hypertension    3. Class 1 obesity due to excess calories with serious comorbidity and body mass index (BMI) of 33.0 to 33.9 in adult             Assessment & Plan  1. Prediabetes.  - A1c level has improved from 6.0 in 11/2024 to 5.8 today, indicating a positive response to current lifestyle modifications.  - Encouraged to continue focusing on " nutrition and increasing physical activity.  - Weight loss is recommended to help further lower the A1c level.  - A1c level will be rechecked in 3 months.    2. Blood pressure management.  - Blood pressure readings have been inconsistent, with recent readings around 130/90 in 04/2025 and occasional spikes to 150-155.  - Advised to aim for a consistent blood pressure reading <140/90.  - Recommended the DASH diet and an increase in potassium, magnesium, and calcium intake.  - Prefers to try lifestyle changes before considering medication; if blood pressure does not improve or worsens, contact the office sooner to discuss potential medication options.    Follow-up  The patient will follow up in 3 months.      Follow Up   Return in about 3 months (around 8/2/2025) for Office visit hypertension, prediabetes.  Patient was given instructions and counseling regarding her condition or for health maintenance advice. Please see specific information pulled into the AVS if appropriate.         Patient or patient representative verbalized consent for the use of Ambient Listening during the visit with  Ciara Ashley MD for chart documentation. 5/2/2025  13:23 EDT    Electronically signed by Ciara Ashley MD, 05/02/25, 1:36 PM EDT.

## 2025-05-07 ENCOUNTER — PATIENT MESSAGE (OUTPATIENT)
Age: 65
End: 2025-05-07
Payer: COMMERCIAL

## 2025-05-07 DIAGNOSIS — Z12.11 SCREENING FOR MALIGNANT NEOPLASM OF COLON: ICD-10-CM

## 2025-05-07 DIAGNOSIS — Z78.0 MENOPAUSE: Primary | ICD-10-CM

## 2025-06-20 ENCOUNTER — HOSPITAL ENCOUNTER (OUTPATIENT)
Dept: BONE DENSITY | Facility: HOSPITAL | Age: 65
Discharge: HOME | End: 2025-06-20
Admitting: FAMILY MEDICINE
Payer: COMMERCIAL

## 2025-06-20 DIAGNOSIS — Z78.0 MENOPAUSE: ICD-10-CM

## 2025-06-20 PROBLEM — M85.88 OSTEOPENIA OF LUMBAR SPINE: Status: ACTIVE | Noted: 2025-06-20

## 2025-06-20 PROCEDURE — 77080 DXA BONE DENSITY AXIAL: CPT

## 2025-08-18 ENCOUNTER — OFFICE VISIT (OUTPATIENT)
Age: 65
End: 2025-08-18
Payer: COMMERCIAL

## 2025-08-18 ENCOUNTER — LAB (OUTPATIENT)
Age: 65
End: 2025-08-18
Payer: COMMERCIAL

## 2025-08-18 VITALS
BODY MASS INDEX: 32.9 KG/M2 | HEART RATE: 86 BPM | DIASTOLIC BLOOD PRESSURE: 80 MMHG | OXYGEN SATURATION: 99 % | HEIGHT: 65 IN | SYSTOLIC BLOOD PRESSURE: 134 MMHG | WEIGHT: 197.44 LBS

## 2025-08-18 DIAGNOSIS — E55.9 VITAMIN D DEFICIENCY: ICD-10-CM

## 2025-08-18 DIAGNOSIS — M85.88 OSTEOPENIA OF LUMBAR SPINE: ICD-10-CM

## 2025-08-18 DIAGNOSIS — R73.03 PREDIABETES: Primary | ICD-10-CM

## 2025-08-18 DIAGNOSIS — E66.811 CLASS 1 OBESITY DUE TO EXCESS CALORIES WITH SERIOUS COMORBIDITY AND BODY MASS INDEX (BMI) OF 32.0 TO 32.9 IN ADULT: ICD-10-CM

## 2025-08-18 DIAGNOSIS — E66.09 CLASS 1 OBESITY DUE TO EXCESS CALORIES WITH SERIOUS COMORBIDITY AND BODY MASS INDEX (BMI) OF 32.0 TO 32.9 IN ADULT: ICD-10-CM

## 2025-08-18 DIAGNOSIS — I10 PRIMARY HYPERTENSION: ICD-10-CM

## 2025-08-18 LAB
EXPIRATION DATE: NORMAL
HBA1C MFR BLD: 5.6 % (ref 4.5–5.7)
Lab: NORMAL

## 2025-08-18 PROCEDURE — 99214 OFFICE O/P EST MOD 30 MIN: CPT | Performed by: FAMILY MEDICINE

## 2025-08-18 PROCEDURE — 83036 HEMOGLOBIN GLYCOSYLATED A1C: CPT | Performed by: FAMILY MEDICINE

## (undated) DEVICE — GLV SURG SENSICARE PI MIC PF SZ7 LF STRL

## (undated) DEVICE — SPNG LAP PREWSH SFTPK 18X18IN STRL PK/5

## (undated) DEVICE — ENDOPATH XCEL BLADELESS TROCARS WITH STABILITY SLEEVES: Brand: ENDOPATH XCEL

## (undated) DEVICE — TOTAL TRAY, 16FR 10ML SIL FOLEY, URN: Brand: MEDLINE

## (undated) DEVICE — PK LAP LASR CHOLE 10

## (undated) DEVICE — TOWEL,OR,DSP,ST,BLUE,STD,4/PK,20PK/CS: Brand: MEDLINE

## (undated) DEVICE — GLV SURG SENSICARE PI MIC PF SZ7.5 LF STRL

## (undated) DEVICE — LEX GENERAL ABDOMINAL SPLIT: Brand: MEDLINE INDUSTRIES, INC.

## (undated) DEVICE — SUT SILK 2/0 TIES 18IN A185H

## (undated) DEVICE — 450 ML BOTTLE OF 0.05% CHLORHEXIDINE GLUCONATE IN 99.95% STERILE WATER FOR IRRIGATION, USP AND APPLICATOR.: Brand: IRRISEPT ANTIMICROBIAL WOUND LAVAGE

## (undated) DEVICE — SUT PDS O CT1 CR/8 18IN Z740D

## (undated) DEVICE — ANTIBACTERIAL UNDYED BRAIDED (POLYGLACTIN 910), SYNTHETIC ABSORBABLE SUTURE: Brand: COATED VICRYL

## (undated) DEVICE — SAFESECURE,SECUREMENT,FOLEY CATH,STERILE: Brand: MEDLINE

## (undated) DEVICE — APPL CHLORAPREP TINTED 26ML TEAL

## (undated) DEVICE — POOLE SUCTION INSTRUMENT WITH REMOVABLE SHEATH: Brand: POOLE

## (undated) DEVICE — SUT PROLN 2/0 PC3 8833H

## (undated) DEVICE — INTENDED FOR TISSUE SEPARATION, AND OTHER PROCEDURES THAT REQUIRE A SHARP SURGICAL BLADE TO PUNCTURE OR CUT.: Brand: BARD-PARKER ® STAINLESS STEEL BLADES

## (undated) DEVICE — SUT SILK 3/0 TIES 18IN A184H

## (undated) DEVICE — GOWN,PREVENTION PLUS,XXLARGE,STERILE: Brand: MEDLINE

## (undated) DEVICE — ENDOPATH XCEL BLUNT TIP TROCARS WITH SMOOTH SLEEVES: Brand: ENDOPATH XCEL

## (undated) DEVICE — MEDI-VAC YANKAUER SUCTION HANDLE: Brand: CARDINAL HEALTH

## (undated) DEVICE — MARYLAND JAW LAPAROSCOPIC SEALER/DIVIDER COATED: Brand: LIGASURE

## (undated) DEVICE — SUT SILK 2/0 SH CR8 18IN CR8 C012D

## (undated) DEVICE — ENDOCUT SCISSOR TIP, DISPOSABLE: Brand: RENEW

## (undated) DEVICE — PATIENT RETURN ELECTRODE, SINGLE-USE, CONTACT QUALITY MONITORING, ADULT, WITH 9FT CORD, FOR PATIENTS WEIGING OVER 33LBS. (15KG): Brand: MEGADYNE

## (undated) DEVICE — SUT SILK 3/0 SH CR8 18IN C013D

## (undated) DEVICE — SUT PDS LP 1 TP1 96IN VIO PDP880GA

## (undated) DEVICE — 30977 SEE SHARP - ENHANCED INTRAOPERATIVE LAPAROSCOPE CLEANING & DEFOGGING: Brand: 30977 SEE SHARP - ENHANCED INTRAOPERATIVE LAPAROSCOPE CLEANING & DEFOGGING

## (undated) DEVICE — SUT VIC 0 UR6 27IN VCP603H

## (undated) DEVICE — SILICONE CLAMP COVERS, STERILE, BLUE, 0.29" (7.40MM) X 5", 2/PKG: Brand: KEY SURGICAL SILICONE CLAMP COVERS

## (undated) DEVICE — [HIGH FLOW INSUFFLATOR,  DO NOT USE IF PACKAGE IS DAMAGED,  KEEP DRY,  KEEP AWAY FROM SUNLIGHT,  PROTECT FROM HEAT AND RADIOACTIVE SOURCES.]: Brand: PNEUMOSURE